# Patient Record
Sex: MALE | Race: WHITE | NOT HISPANIC OR LATINO | Employment: OTHER | URBAN - METROPOLITAN AREA
[De-identification: names, ages, dates, MRNs, and addresses within clinical notes are randomized per-mention and may not be internally consistent; named-entity substitution may affect disease eponyms.]

---

## 2017-02-15 ENCOUNTER — HOSPITAL ENCOUNTER (EMERGENCY)
Facility: HOSPITAL | Age: 62
Discharge: HOME | End: 2017-02-15
Attending: EMERGENCY MEDICINE
Payer: COMMERCIAL

## 2017-02-15 VITALS
OXYGEN SATURATION: 95 % | WEIGHT: 250 LBS | RESPIRATION RATE: 18 BRPM | SYSTOLIC BLOOD PRESSURE: 152 MMHG | HEIGHT: 74 IN | TEMPERATURE: 98 F | HEART RATE: 70 BPM | DIASTOLIC BLOOD PRESSURE: 71 MMHG | BODY MASS INDEX: 32.08 KG/M2

## 2017-02-15 DIAGNOSIS — N20.1 URETERAL CALCULUS, LEFT: Primary | ICD-10-CM

## 2017-02-15 LAB
ANION GAP SERPL CALC-SCNC: 12 MMOL/L
BACTERIA #/AREA URNS HPF: ABNORMAL /HPF
BASOPHILS # BLD AUTO: 0.06 K/UL
BASOPHILS NFR BLD: 0.6 %
BILIRUB UR QL STRIP: NEGATIVE
BUN SERPL-MCNC: 20 MG/DL
CALCIUM SERPL-MCNC: 10.1 MG/DL
CHLORIDE SERPL-SCNC: 103 MMOL/L
CLARITY UR: CLEAR
CO2 SERPL-SCNC: 24 MMOL/L
COLOR UR: YELLOW
CREAT SERPL-MCNC: 1.6 MG/DL
DIFFERENTIAL METHOD: ABNORMAL
EOSINOPHIL # BLD AUTO: 0.3 K/UL
EOSINOPHIL NFR BLD: 3 %
ERYTHROCYTE [DISTWIDTH] IN BLOOD BY AUTOMATED COUNT: 13.7 %
EST. GFR  (AFRICAN AMERICAN): 53 ML/MIN/1.73 M^2
EST. GFR  (NON AFRICAN AMERICAN): 45 ML/MIN/1.73 M^2
GLUCOSE SERPL-MCNC: 125 MG/DL
GLUCOSE UR QL STRIP: NEGATIVE
HCT VFR BLD AUTO: 43.1 %
HGB BLD-MCNC: 14.7 G/DL
HGB UR QL STRIP: ABNORMAL
KETONES UR QL STRIP: NEGATIVE
LEUKOCYTE ESTERASE UR QL STRIP: NEGATIVE
LYMPHOCYTES # BLD AUTO: 1.5 K/UL
LYMPHOCYTES NFR BLD: 13.6 %
MCH RBC QN AUTO: 30.6 PG
MCHC RBC AUTO-ENTMCNC: 34.1 %
MCV RBC AUTO: 90 FL
MICROSCOPIC COMMENT: ABNORMAL
MONOCYTES # BLD AUTO: 1.1 K/UL
MONOCYTES NFR BLD: 10.6 %
NEUTROPHILS # BLD AUTO: 7.7 K/UL
NEUTROPHILS NFR BLD: 72.2 %
NITRITE UR QL STRIP: NEGATIVE
PH UR STRIP: 6 [PH] (ref 5–8)
PLATELET # BLD AUTO: 197 K/UL
PMV BLD AUTO: 9.3 FL
POTASSIUM SERPL-SCNC: 4.2 MMOL/L
PROT UR QL STRIP: NEGATIVE
RBC # BLD AUTO: 4.81 M/UL
RBC #/AREA URNS HPF: 5 /HPF (ref 0–4)
SODIUM SERPL-SCNC: 139 MMOL/L
SP GR UR STRIP: 1.02 (ref 1–1.03)
SQUAMOUS #/AREA URNS HPF: 0 /HPF
URN SPEC COLLECT METH UR: ABNORMAL
UROBILINOGEN UR STRIP-ACNC: NEGATIVE EU/DL
WBC # BLD AUTO: 10.71 K/UL
WBC #/AREA URNS HPF: 0 /HPF (ref 0–5)

## 2017-02-15 PROCEDURE — 99284 EMERGENCY DEPT VISIT MOD MDM: CPT | Mod: 25

## 2017-02-15 PROCEDURE — 63600175 PHARM REV CODE 636 W HCPCS: Performed by: EMERGENCY MEDICINE

## 2017-02-15 PROCEDURE — 81000 URINALYSIS NONAUTO W/SCOPE: CPT

## 2017-02-15 PROCEDURE — 80048 BASIC METABOLIC PNL TOTAL CA: CPT

## 2017-02-15 PROCEDURE — 25000003 PHARM REV CODE 250: Performed by: EMERGENCY MEDICINE

## 2017-02-15 PROCEDURE — 96374 THER/PROPH/DIAG INJ IV PUSH: CPT

## 2017-02-15 PROCEDURE — 85025 COMPLETE CBC W/AUTO DIFF WBC: CPT

## 2017-02-15 PROCEDURE — 96361 HYDRATE IV INFUSION ADD-ON: CPT

## 2017-02-15 RX ORDER — OXYCODONE AND ACETAMINOPHEN 10; 325 MG/1; MG/1
1 TABLET ORAL EVERY 4 HOURS PRN
Qty: 12 TABLET | Refills: 0 | Status: SHIPPED | OUTPATIENT
Start: 2017-02-15 | End: 2017-03-20

## 2017-02-15 RX ORDER — KETOROLAC TROMETHAMINE 30 MG/ML
15 INJECTION, SOLUTION INTRAMUSCULAR; INTRAVENOUS
Status: COMPLETED | OUTPATIENT
Start: 2017-02-15 | End: 2017-02-15

## 2017-02-15 RX ORDER — TAMSULOSIN HYDROCHLORIDE 0.4 MG/1
0.4 CAPSULE ORAL
Status: COMPLETED | OUTPATIENT
Start: 2017-02-15 | End: 2017-02-15

## 2017-02-15 RX ORDER — TAMSULOSIN HYDROCHLORIDE 0.4 MG/1
0.4 CAPSULE ORAL DAILY
Qty: 30 CAPSULE | Refills: 0 | Status: SHIPPED | OUTPATIENT
Start: 2017-02-15 | End: 2017-03-20

## 2017-02-15 RX ORDER — SODIUM CHLORIDE 9 MG/ML
1000 INJECTION, SOLUTION INTRAVENOUS
Status: COMPLETED | OUTPATIENT
Start: 2017-02-15 | End: 2017-02-15

## 2017-02-15 RX ADMIN — SODIUM CHLORIDE 1000 ML: 0.9 INJECTION, SOLUTION INTRAVENOUS at 03:02

## 2017-02-15 RX ADMIN — KETOROLAC TROMETHAMINE 15 MG: 30 INJECTION, SOLUTION INTRAMUSCULAR at 03:02

## 2017-02-15 RX ADMIN — TAMSULOSIN HYDROCHLORIDE 0.4 MG: 0.4 CAPSULE ORAL at 03:02

## 2017-02-15 NOTE — ED NOTES
Pt resting in bed at this time. NAD noted. AAO x 3. Pt reports relief in pain. RR e/u, airway open and patent. Bed in locked and low position, side rails up x 2, call light within reach, will continue to monitor.

## 2017-02-15 NOTE — ED PROVIDER NOTES
SCRIBE #1 NOTE: I, Partha Saleh, am scribing for, and in the presence of, Maia Ramirez MD. I have scribed the entire note.      History      Chief Complaint   Patient presents with    Flank Pain     left flank pain radiating to LLQ since this evening        Review of patient's allergies indicates:  No Known Allergies     HPI   HPI    2/15/2017, 2:52 AM   History obtained from the patient      History of Present Illness: Dominguez Hutton is a 61 y.o. male patient who presents to the Emergency Department for left flank pain which onset gradually 15 hours PTA. Symptoms are constant and moderate in severity. The patient describes the sxs as a stabbing pain. Pt states the pain worsened 9 hours ago while driving back from [place]. Pt states the pain radiates to the LLQ. Pt states the pain is relieved by standing up. No mitigating or exacerbating factors reported. No associated sx reported. Patient denies any fever, chills, hematuria, dysuria, blood in stool, constipation, HA, lightheadedness, dizzziness, and all other sxs at this time. No further complaints or concerns at this time.         Arrival mode: Personal vehicle     PCP: Murtaza Castillo MD       Past Medical History:  Past Medical History   Diagnosis Date    Diabetes mellitus, type 2 4/20/2016     3/4/2016: Fasting Glucose 139.    Disc herniation     Essential hypertension 2/18/2016 2010:  Diagnosed.    History of gout 2/18/2016 1995: Diagnosed. Feet, elbow and knee.     Hypercholesterolemia 2/18/2016 2013: Began statin.    Hyperlipemia     Hypertension     Nonrheumatic aortic insufficiency with aortic stenosis 2/18/2016 9/9/2015: Echo: Normal left ventricular size and systolic function. Moderate LVH. Mild diastolic dysfunction. Mildly dilated LA. Mild aortic valve sclerosis. Mild to moderate AR.       Past Surgical History:  Past Surgical History   Procedure Laterality Date    Hernia repair           Family History:  No family  history on file.    Social History:  Social History     Social History Main Topics    Smoking status: Never Smoker    Smokeless tobacco: Never Used    Alcohol use Yes      Comment: social    Drug use: No    Sexual activity: Unknown       ROS   Review of Systems   Constitutional: Negative for chills and fever.   HENT: Negative for sore throat.    Respiratory: Negative for shortness of breath.    Cardiovascular: Negative for chest pain.   Gastrointestinal: Positive for abdominal pain (LLQ). Negative for blood in stool, constipation, diarrhea, nausea and vomiting.   Genitourinary: Positive for flank pain (left). Negative for dysuria and hematuria.   Musculoskeletal: Negative for back pain.   Skin: Negative for rash.   Neurological: Negative for dizziness, weakness, light-headedness and headaches.   Hematological: Does not bruise/bleed easily.       Physical Exam    Initial Vitals   BP Pulse Resp Temp SpO2   02/15/17 0147 02/15/17 0147 02/15/17 0147 02/15/17 0147 02/15/17 0147   175/80 94 18 98.9 °F (37.2 °C) 96 %      Physical Exam  Nursing Notes and Vital Signs Reviewed.  Constitutional: Patient is in no acute distress. Awake and alert. Well-developed and well-nourished.  Head: Atraumatic. Normocephalic.  Eyes: PERRL. EOM intact. Conjunctivae are not pale. No scleral icterus.  ENT: Mucous membranes are moist. Oropharynx is clear and symmetric.    Neck: Supple. Full ROM. No lymphadenopathy.  Cardiovascular: Regular rate. Regular rhythm. No murmurs, rubs, or gallops. Distal pulses are 2+ and symmetric. Prominant heart sound in sub-intercostal space.  Pulmonary/Chest: No respiratory distress. Clear to auscultation bilaterally. No wheezing, rales, or rhonchi.  Abdominal: Soft and non-distended.  There is no tenderness.  No rebound, guarding, or rigidity. Good bowel sounds.  Genitourinary: Left CVA tenderness  Musculoskeletal: Moves all extremities. No obvious deformities. 2+ bilateral lower extremity edema. No calf  "tenderness.  Skin: Warm and dry.  Neurological:  Alert, awake, and appropriate.  Normal speech.  No acute focal neurological deficits are appreciated.  Psychiatric: Normal affect. Good eye contact. Appropriate in content.    ED Course    Procedures  ED Vital Signs:  Vitals:    02/15/17 0147 02/15/17 0347 02/15/17 0517   BP: (!) 175/80 (!) 148/70 (!) 152/71   Pulse: 94 65 70   Resp: 18 18 18   Temp: 98.9 °F (37.2 °C)  97.6 °F (36.4 °C)   TempSrc: Oral  Oral   SpO2: 96% 95% 95%   Weight: 113.4 kg (250 lb)     Height: 6' 2" (1.88 m)         Abnormal Lab Results:  Labs Reviewed   URINALYSIS - Abnormal; Notable for the following:        Result Value    Occult Blood UA 2+ (*)     All other components within normal limits   CBC W/ AUTO DIFFERENTIAL - Abnormal; Notable for the following:     Mono # 1.1 (*)     Lymph% 13.6 (*)     All other components within normal limits   BASIC METABOLIC PANEL - Abnormal; Notable for the following:     Glucose 125 (*)     Creatinine 1.6 (*)     eGFR if  53 (*)     eGFR if non  45 (*)     All other components within normal limits   URINALYSIS MICROSCOPIC - Abnormal; Notable for the following:     RBC, UA 5 (*)     All other components within normal limits        All Lab Results:  Results for orders placed or performed during the hospital encounter of 02/15/17   Urinalysis   Result Value Ref Range    Specimen UA Urine, Clean Catch     Color, UA Yellow Yellow, Straw, Marianela    Appearance, UA Clear Clear    pH, UA 6.0 5.0 - 8.0    Specific Gravity, UA 1.025 1.005 - 1.030    Protein, UA Negative Negative    Glucose, UA Negative Negative    Ketones, UA Negative Negative    Bilirubin (UA) Negative Negative    Occult Blood UA 2+ (A) Negative    Nitrite, UA Negative Negative    Urobilinogen, UA Negative <2.0 EU/dL    Leukocytes, UA Negative Negative   CBC auto differential   Result Value Ref Range    WBC 10.71 3.90 - 12.70 K/uL    RBC 4.81 4.60 - 6.20 M/uL    " Hemoglobin 14.7 14.0 - 18.0 g/dL    Hematocrit 43.1 40.0 - 54.0 %    MCV 90 82 - 98 fL    MCH 30.6 27.0 - 31.0 pg    MCHC 34.1 32.0 - 36.0 %    RDW 13.7 11.5 - 14.5 %    Platelets 197 150 - 350 K/uL    MPV 9.3 9.2 - 12.9 fL    Gran # 7.7 1.8 - 7.7 K/uL    Lymph # 1.5 1.0 - 4.8 K/uL    Mono # 1.1 (H) 0.3 - 1.0 K/uL    Eos # 0.3 0.0 - 0.5 K/uL    Baso # 0.06 0.00 - 0.20 K/uL    Gran% 72.2 38.0 - 73.0 %    Lymph% 13.6 (L) 18.0 - 48.0 %    Mono% 10.6 4.0 - 15.0 %    Eosinophil% 3.0 0.0 - 8.0 %    Basophil% 0.6 0.0 - 1.9 %    Differential Method Automated    Basic metabolic panel   Result Value Ref Range    Sodium 139 136 - 145 mmol/L    Potassium 4.2 3.5 - 5.1 mmol/L    Chloride 103 95 - 110 mmol/L    CO2 24 23 - 29 mmol/L    Glucose 125 (H) 70 - 110 mg/dL    BUN, Bld 20 8 - 23 mg/dL    Creatinine 1.6 (H) 0.5 - 1.4 mg/dL    Calcium 10.1 8.7 - 10.5 mg/dL    Anion Gap 12 8 - 16 mmol/L    eGFR if African American 53 (A) >60 mL/min/1.73 m^2    eGFR if non African American 45 (A) >60 mL/min/1.73 m^2   Urinalysis Microscopic   Result Value Ref Range    RBC, UA 5 (H) 0 - 4 /hpf    WBC, UA 0 0 - 5 /hpf    Bacteria, UA None None-Occ /hpf    Squam Epithel, UA 0 /hpf    Microscopic Comment SEE COMMENT          Imaging Results:  Imaging Results         CT Renal Stone Study ABD Pelvis WO (Final result) Result time:  02/15/17 08:40:52    Final result by Tristen Serrano MD (02/15/17 08:40:52)    Impression:             1.  Obstructing 10 mm calculus in the proximal left ureter causing mild left hydronephrosis.  2.  Numerous bilateral nonobstructing renal calculi.    All CT scans at this facility use dose modulation, iterative reconstruction and/or weight based dosing when appropriate to reduce radiation dose to as low as reasonably achievable.      Electronically signed by: TRISTEN SERRANO MD  Date:     02/15/17  Time:    08:40     Narrative:    Exam: CT abdomen and pelvis without intravenous contrast.    Technique: Axial CT images  performed through the abdomen and pelvis without intravenous contrast. Multiplanar reformats were performed and interpreted.    Comparison: None    Clinical History: left flank pain.   Abdominal pain    Findings:         Lung bases are clear.    There is an obstructing 10 mm calculus in the proximal left ureter causing mild left hydronephrosis and asymmetric left perinephric fat stranding.  There are numerous bilateral nonobstructing renal calculi.  No hydronephrosis of the right kidney.  There are a couple of small low density nodules in the inferior liver edge which are too small to characterize on this unenhanced exam, but probably small simple cysts.  The spleen, adrenal glands, and pancreas are unremarkable.  Normal appendix.   The gallbladder is unremarkable.  No peritoneal free fluid, free air, or inflammatory change.     The bowel is nondistended and within normal limits.    The abdominal aorta is normal in caliber.    The urinary bladder is unremarkable.       There are degenerative facet joint changes bilaterally in the lower lumbar spine.             Ordered, reviewed, and independently interpreted by the ED provider.  Study: CT Renal stone study ABD Pelvis WO  Findings: Nephrolithiasis. Proximal left ureter 9 mm renal stone and mild left hydronephrosis are noted. Diverticulosis coli.             The Emergency Provider reviewed the vital signs and test results, which are outlined above.    ED Discussion     4:48 AM: Reassessed pt at this time. Discussed with pt all pertinent ED information and results. Discussed pt dx and plan of tx. Gave pt all f/u and return to the ED instructions. All questions and concerns were addressed at this time. Pt expresses understanding of information and instructions, and is comfortable with plan to discharge. Pt is stable for discharge.    Pre-hypertension/Hypertension: The pt has been informed that they may have pre-hypertension or hypertension based on a blood pressure  reading in the ED. I recommend that the pt call the PCP listed on their discharge instructions or a physician of their choice this week to arrange f/u for further evaluation of possible pre-hypertension or hypertension.       ED Medication(s):  Medications   0.9%  NaCl infusion (0 mLs Intravenous Stopped 2/15/17 0517)   ketorolac injection 15 mg (15 mg Intravenous Given 2/15/17 0310)   tamsulosin 24 hr capsule 0.4 mg (0.4 mg Oral Given 2/15/17 0310)       Discharge Medication List as of 2/15/2017  4:47 AM      START taking these medications    Details   tamsulosin (FLOMAX) 0.4 mg Cp24 Take 1 capsule (0.4 mg total) by mouth once daily., Starting 2/15/2017, Until Discontinued, Print             Follow-up Information     Follow up with Baljinder Marie IV, MD In 2 days.    Specialty:  Urology    Contact information:    [address]  [place] [address] [phone]          Follow up with [facility] - .    Specialty:  Emergency Medicine    Why:  As needed, If symptoms worsen    Contact information:    [address] [facility] [id number] [phone]            Medical Decision Making    Medical Decision Making:   Clinical Tests:   Lab Tests: Reviewed and Ordered  Radiological Study: Reviewed and Ordered           Scribe Attestation:   Scribe #1: I performed the above scribed service and the documentation accurately describes the services I performed. I attest to the accuracy of the note.    Attending:   Physician Attestation Statement for Scribe #1: I, Maia Ramirez MD, personally performed the services described in this documentation, as scribed by Partha Saleh, in my presence, and it is both accurate and complete.          Clinical Impression       ICD-10-CM ICD-9-CM   1. Ureteral calculus, left N20.1 592.1       Disposition:   Disposition: Discharged  Condition: Stable         Maia Ramirez MD  02/16/17 1658

## 2017-02-15 NOTE — ED NOTES
NAD noted. AAO x 3. RR e/u, airway open and patent. Pt able to ambulate without assistance. Will continue with discharge.

## 2017-02-15 NOTE — ED AVS SNAPSHOT
[facility]  [id number] [place] [id number]               Dominguez Hutton   2/15/2017  2:41 AM   ED    Description:  Male : 1955   Department:  [facility]           Your Care was Coordinated By:     Provider Role From To    Maia Ramirez MD Attending Provider 02/15/17 0245 --      Reason for Visit     Flank Pain           Diagnoses this Visit        Comments    Ureteral calculus, left    -  Primary       ED Disposition     ED Disposition Condition Comment    Discharge             To Do List           Follow-up Information     Follow up with Baljinder Marie IV, MD In 2 days.    Specialty:  Urology    Contact information:    [address]  [place] [address]  [phone]          Follow up with [facility].    Specialty:  Emergency Medicine    Why:  As needed, If symptoms worsen    Contact information:    [address] [place] [id number] [phone]       These Medications        Disp Refills Start End    tamsulosin (FLOMAX) 0.4 mg Cp24 30 capsule 0 2/15/2017     Take 1 capsule (0.4 mg total) by mouth once daily. - Oral    Pharmacy: [facility] [address] [place] [address] [address] AT [facility] Ph #: [phone]       oxycodone-acetaminophen (PERCOCET)  mg per tablet 12 tablet 0 2/15/2017     Take 1 tablet by mouth every 4 (four) hours as needed for Pain. - Oral    Pharmacy: [facility] [address] [place] [address] [address] AT [facility] Ph #: [phone]         [facility]     [facility] -  Assistance  Registered nurses in the [facility] provide clinical advisement, health education, appointment booking, and other advisory services.  Call for this free service at [phone].             Medications           Message regarding Medications     Verify the changes  and/or additions to your medication regime listed below are the same as discussed with your clinician today.  If any of these changes or additions are incorrect, please notify your healthcare provider.        START taking these NEW medications        Refills    tamsulosin (FLOMAX) 0.4 mg Cp24 0    Sig: Take 1 capsule (0.4 mg total) by mouth once daily.    Class: Print    Route: Oral      These medications were administered today        Dose Freq    0.9%  NaCl infusion 1,000 mL ED 1 Time    Sig: Inject 1,000 mLs into the vein ED 1 Time.    Class: Normal    Route: Intravenous    ketorolac injection 15 mg 15 mg ED 1 Time    Sig: Inject 15 mg into the vein ED 1 Time.    Class: Normal    Route: Intravenous    Non-formulary Exception Code: Defer to pharmacy    tamsulosin 24 hr capsule 0.4 mg 0.4 mg ED 1 Time    Sig: Take 1 capsule (0.4 mg total) by mouth ED 1 Time.    Class: Normal    Route: Oral           Verify that the below list of medications is an accurate representation of the medications you are currently taking.  If none reported, the list may be blank. If incorrect, please contact your healthcare provider. Carry this list with you in case of emergency.           Current Medications     alendronate-vitamin D3 (FOSAMAX PLUS D) 70-2,800 mg-unit per tablet Take 1 tablet by mouth every 7 days.    allopurinol (ZYLOPRIM) 300 MG tablet Take 300 mg by mouth once daily.     amlodipine (NORVASC) 10 MG tablet Take 1 tablet (10 mg total) by mouth once daily.    aspirin (ECOTRIN) 81 MG EC tablet Take 81 mg by mouth once daily.    b complex vitamins tablet Take 1 tablet by mouth once daily.    ergocalciferol (ERGOCALCIFEROL) 50,000 unit Cap Take 50,000 Units by mouth every 7 days.    fish oil-omega-3 fatty acids 300-1,000 mg capsule Take 2 g by mouth once daily.    gabapentin (NEURONTIN) 300 MG capsule Take 1 capsule (300 mg total) by mouth 3 (three) times daily.    gabapentin (NEURONTIN) 300 MG capsule Take one in the  "morning, one in the afternoon and two at bedtime for 5 days.  Then, two in the morning, one in the afternoon and two at bedtime for 5 days.  Then, two in the morning, two in the afternoon and two at bedtime if tolerated.    hydrochlorothiazide (MICROZIDE) 12.5 mg capsule Take 1 capsule (12.5 mg total) by mouth once daily.    hydrocodone-acetaminophen 10-325mg (NORCO)  mg Tab Take 1 tablet by mouth 3 (three) times daily as needed.     HYDROmorphone (DILAUDID) 4 MG tablet Take 1 tablet (4 mg total) by mouth every 3 (three) hours as needed for Pain.    ketorolac (TORADOL) 10 mg tablet Take 1 tablet (10 mg total) by mouth 2 (two) times daily as needed for Pain.    lisinopril (PRINIVIL,ZESTRIL) 20 MG tablet Take 20 mg by mouth once daily.     multivitamin (THERAGRAN) per tablet Take 1 tablet by mouth once daily.    oxycodone-acetaminophen (PERCOCET)  mg per tablet Take 1 tablet by mouth every 4 (four) hours as needed for Pain.    pravastatin (PRAVACHOL) 40 MG tablet Take 40 mg by mouth once daily.     tamsulosin (FLOMAX) 0.4 mg Cp24 Take 1 capsule (0.4 mg total) by mouth once daily.    tamsulosin 24 hr capsule 0.4 mg Take 1 capsule (0.4 mg total) by mouth ED 1 Time.           Clinical Reference Information           Your Vitals Were     BP Pulse Temp Resp Height Weight    148/70 65 98.9 °F (37.2 °C) (Oral) 18 6' 2" (1.88 m) 113.4 kg (250 lb)    SpO2 BMI             95% 32.1 kg/m2         Allergies as of 2/15/2017     No Known Allergies      Immunizations Administered on Date of Encounter - 2/15/2017     None      ED Micro, Lab, POCT     Start Ordered       Status Ordering Provider    02/15/17 0256 02/15/17 0256  CBC auto differential  STAT      Final result     02/15/17 0256 02/15/17 0256  Basic metabolic panel  STAT      Final result     02/15/17 0153 02/15/17 0152  Urinalysis  STAT      Final result     02/15/17 0152 02/15/17 0152  Urinalysis Microscopic  Once      Final result       ED Imaging Orders     " Start Ordered       Status Ordering Provider    02/15/17 0400 02/15/17 0359  CT Renal Stone Study ABD Pelvis WO  1 time imaging      In process         Discharge Instructions         Kidney Stone (with Pain)    The sharp cramping pain on either side of your lower back and nausea/vomiting that you have are because of a small stone that has formed in the kidney. It is now passing down a narrow tube (ureter) on its way to your bladder. Once the stone reaches your bladder, the pain will often stop. But it may come back as the stone continues to pass out of the bladder and through the urethra. The stone may pass in your urine stream in one piece. The size may be 1/16 inch to 1/4 inch (1 mm to 6 mm). Or, the stone may break up into azra fragments that you may not even notice.  Once you have had a kidney stone, you are at risk of getting another one in the future. There are 4 types of kidney stones. Eighty percent are calcium stones--mostly calcium oxalate but also some with calcium phosphate. The other 3 types include uric acid stones, struvite stones (from a preceding infection), and rarely, cystine stones.  Most stones will pass on their own, but may take from a few hours to a few days. Sometimes the stone is too large to pass by itself. In that case, the healthcare provider will need to use other ways to remove the stone. These techniques include:  · Lithotripsy. This uses ultrasound waves to break up the stone.  · Ureteroscopy. This pushes a basket-like instrument through the urethra and bladder and into the ureter to pull out the stone.  · Various types of direct surgery through the skin  Home care  The following are general care guidelines:  · Drink plenty of fluids. This means at least 12, 8-ounce glasses of fluid--mostly water--a day.  · Each time you urinate, do so in a jar. Pour the urine from the jar through the strainer and into the toilet. Continue doing this until 24 hours after your pain stops. By then, if  there was a kidney stone, it should pass from your bladder. Some stones dissolve into sand-like particles and pass right through the strainer. In that case, you wont ever see a stone.  · Save any stone that you find in the strainer and bring it to your healthcare provider to look at. It may be possible to stop certain types of stones from forming. For this reason, it is important to know what kind of stone you have.  · Try to stay as active as possible. This will help the stone pass. Don't stay in bed unless your pain keeps you from getting up. You may notice a red, pink, or brown color to your urine. This is normal while passing a kidney stone.  · If you develop pain, you may take ibuprofen or naproxen for pain, unless another medicine was prescribed. If you have chronic liver or kidney disease, talk with your healthcare provider before taking these medicines. Also talk with your provider if you've had a stomach ulcer or GI bleeding.  Preventing stones  Each year for the next 5 to 7 years, you are at risk that a new stone will form. Your risk is a 50% chance over this time period. The risk is higher if you have a family history of kidney stones or have certain chronic illnesses like hypertension, obesity, or diabetes. But you can make changes to your lifestyle and diet that can lower your risk for another stone.  Most kidney stones are made of calcium. The following is advice for preventing another calcium stone. If you dont know the type of stone you have, follow this advice until the cause of your stone is found.  Things that help:  · The most important thing you can do is to drink plenty of fluids each day. See home care above.   · Eat foods that contain phytates. These include wheat, rice, rye, barley, and beans. Phytates are substances that may lower your risk for any type of stone to form.  · Eat more fruits and vegetables. Choose those that are high in potassium.  · Eat foods high in natural citrate  like fruit and low-sugar fruit juices.  · Having too little calcium in your diet can put you at risk for calcium kidney stones. Eat a normal amount of calcium in your diet and talk with your healthcare provider if you are taking calcium supplements. Cutting back on your calcium intake may raise your risk. New research shows that eating calcium-rich and oxalate-rich foods together lowers your risk for stones by binding the minerals in the stomach and intestines before they can reach the kidneys.    · Limit salt intake to 2 grams (1 teaspoon) per day. Use limited amounts when cooking, and dont add salt at the table. Processed and canned foods are usually high in salt.   · Spinach, rhubarb, peanuts, cashews, almonds, grapefruit, and grapefruit juice are all high oxalate foods. You should limit how much of these you eat. Or eat them with calcium-rich foods. These include dairy products, dark leafy greens, soy products, and calcium-enriched foods.  · Reducing the amount of animal meat and high protein foods in your diet may lower your risk for uric acid stones.  · Avoid excess sugar (sucrose) and fructose (sweetener in many soft drinks) in your diet.   · If you take vitamin C as a supplement, don't take more than 1,000 mg a day.  · A dietitian or your healthcare provider can give you information about changes in your diet that will help prevent more kidney stones from forming.  Follow-up care  Follow up with your healthcare provider, or as advised, if the pain lasts more than 48 hours. Talk with your provider about urine and blood tests to find out the cause of your stone. If you had an X-ray, CT scan, or other diagnostic test, you will be told of any new findings that may affect your care.  Call 911  Call 911 if you have any of these:  · Weakness, dizziness, or fainting  When to seek medical advice  Call your healthcare provider right away if any of these occur:  · Pain that is not controlled by the medicine  given  · Repeated vomiting or unable to keep down fluids  · Fever of 100.4ºF (38ºC) or higher, or as directed by your healthcare provider  · Passage of solid red or brown urine (can't see through it) or urine with lots of blood clots  · Foul-smelling or cloudy urine  · Unable to pass urine for 8 hours and increasing bladder pressure  Date Last Reviewed: 10/1/2016  © 7691-8822 [employer] [address] [place] [address], [place] [address]. All rights reserved. This information is not intended as a substitute for professional medical care. Always follow your healthcare professional's instructions.           [facility] complies with applicable Federal civil rights laws and does not discriminate on the basis of race, color, national origin, age, disability, or sex.        Language Assistance Services     ATTENTION: Language assistance services are available, free of charge. Please call [phone].      ATENCIÓN: Si habla español, tiene a vidales disposición servicios gratuitos de asistencia lingüística. Llame al [phone].     CHÚ Ý: N?u b?n nói Ti?ng Vi?t, có các d?ch v? h? tr? ngôn ng? mi?n phí dành cho b?n. G?i s? [phone].

## 2017-02-15 NOTE — DISCHARGE INSTRUCTIONS
Kidney Stone (with Pain)    The sharp cramping pain on either side of your lower back and nausea/vomiting that you have are because of a small stone that has formed in the kidney. It is now passing down a narrow tube (ureter) on its way to your bladder. Once the stone reaches your bladder, the pain will often stop. But it may come back as the stone continues to pass out of the bladder and through the urethra. The stone may pass in your urine stream in one piece. The size may be 1/16 inch to 1/4 inch (1 mm to 6 mm). Or, the stone may break up into azra fragments that you may not even notice.  Once you have had a kidney stone, you are at risk of getting another one in the future. There are 4 types of kidney stones. Eighty percent are calcium stones--mostly calcium oxalate but also some with calcium phosphate. The other 3 types include uric acid stones, struvite stones (from a preceding infection), and rarely, cystine stones.  Most stones will pass on their own, but may take from a few hours to a few days. Sometimes the stone is too large to pass by itself. In that case, the healthcare provider will need to use other ways to remove the stone. These techniques include:  · Lithotripsy. This uses ultrasound waves to break up the stone.  · Ureteroscopy. This pushes a basket-like instrument through the urethra and bladder and into the ureter to pull out the stone.  · Various types of direct surgery through the skin  Home care  The following are general care guidelines:  · Drink plenty of fluids. This means at least 12, 8-ounce glasses of fluid--mostly water--a day.  · Each time you urinate, do so in a jar. Pour the urine from the jar through the strainer and into the toilet. Continue doing this until 24 hours after your pain stops. By then, if there was a kidney stone, it should pass from your bladder. Some stones dissolve into sand-like particles and pass right through the strainer. In that case, you wont ever see a  stone.  · Save any stone that you find in the strainer and bring it to your healthcare provider to look at. It may be possible to stop certain types of stones from forming. For this reason, it is important to know what kind of stone you have.  · Try to stay as active as possible. This will help the stone pass. Don't stay in bed unless your pain keeps you from getting up. You may notice a red, pink, or brown color to your urine. This is normal while passing a kidney stone.  · If you develop pain, you may take ibuprofen or naproxen for pain, unless another medicine was prescribed. If you have chronic liver or kidney disease, talk with your healthcare provider before taking these medicines. Also talk with your provider if you've had a stomach ulcer or GI bleeding.  Preventing stones  Each year for the next 5 to 7 years, you are at risk that a new stone will form. Your risk is a 50% chance over this time period. The risk is higher if you have a family history of kidney stones or have certain chronic illnesses like hypertension, obesity, or diabetes. But you can make changes to your lifestyle and diet that can lower your risk for another stone.  Most kidney stones are made of calcium. The following is advice for preventing another calcium stone. If you dont know the type of stone you have, follow this advice until the cause of your stone is found.  Things that help:  · The most important thing you can do is to drink plenty of fluids each day. See home care above.   · Eat foods that contain phytates. These include wheat, rice, rye, barley, and beans. Phytates are substances that may lower your risk for any type of stone to form.  · Eat more fruits and vegetables. Choose those that are high in potassium.  · Eat foods high in natural citrate like fruit and low-sugar fruit juices.  · Having too little calcium in your diet can put you at risk for calcium kidney stones. Eat a normal amount of calcium in your diet and  talk with your healthcare provider if you are taking calcium supplements. Cutting back on your calcium intake may raise your risk. New research shows that eating calcium-rich and oxalate-rich foods together lowers your risk for stones by binding the minerals in the stomach and intestines before they can reach the kidneys.    · Limit salt intake to 2 grams (1 teaspoon) per day. Use limited amounts when cooking, and dont add salt at the table. Processed and canned foods are usually high in salt.   · Spinach, rhubarb, peanuts, cashews, almonds, grapefruit, and grapefruit juice are all high oxalate foods. You should limit how much of these you eat. Or eat them with calcium-rich foods. These include dairy products, dark leafy greens, soy products, and calcium-enriched foods.  · Reducing the amount of animal meat and high protein foods in your diet may lower your risk for uric acid stones.  · Avoid excess sugar (sucrose) and fructose (sweetener in many soft drinks) in your diet.   · If you take vitamin C as a supplement, don't take more than 1,000 mg a day.  · A dietitian or your healthcare provider can give you information about changes in your diet that will help prevent more kidney stones from forming.  Follow-up care  Follow up with your healthcare provider, or as advised, if the pain lasts more than 48 hours. Talk with your provider about urine and blood tests to find out the cause of your stone. If you had an X-ray, CT scan, or other diagnostic test, you will be told of any new findings that may affect your care.  Call 911  Call 911 if you have any of these:  · Weakness, dizziness, or fainting  When to seek medical advice  Call your healthcare provider right away if any of these occur:  · Pain that is not controlled by the medicine given  · Repeated vomiting or unable to keep down fluids  · Fever of 100.4ºF (38ºC) or higher, or as directed by your healthcare provider  · Passage of solid red or brown urine (can't  see through it) or urine with lots of blood clots  · Foul-smelling or cloudy urine  · Unable to pass urine for 8 hours and increasing bladder pressure  Date Last Reviewed: 10/1/2016  © 4007-9987 [employer]. [address] [place] [address], [place] [address]. All rights reserved. This information is not intended as a substitute for professional medical care. Always follow your healthcare professional's instructions.

## 2017-02-16 ENCOUNTER — HOSPITAL ENCOUNTER (OUTPATIENT)
Dept: RADIOLOGY | Facility: HOSPITAL | Age: 62
Discharge: HOME | End: 2017-02-16
Attending: UROLOGY
Payer: COMMERCIAL

## 2017-02-16 ENCOUNTER — OFFICE VISIT (OUTPATIENT)
Dept: UROLOGY | Facility: CLINIC | Age: 62
End: 2017-02-16
Payer: COMMERCIAL

## 2017-02-16 ENCOUNTER — APPOINTMENT (OUTPATIENT)
Dept: CARDIOLOGY | Facility: CLINIC | Age: 62
End: 2017-02-16
Payer: COMMERCIAL

## 2017-02-16 ENCOUNTER — TELEPHONE (OUTPATIENT)
Dept: UROLOGY | Facility: CLINIC | Age: 62
End: 2017-02-16

## 2017-02-16 VITALS — WEIGHT: 260.13 LBS | BODY MASS INDEX: 33.4 KG/M2 | SYSTOLIC BLOOD PRESSURE: 138 MMHG | DIASTOLIC BLOOD PRESSURE: 72 MMHG

## 2017-02-16 DIAGNOSIS — N20.1 URETERAL STONE: ICD-10-CM

## 2017-02-16 DIAGNOSIS — N20.1 URETERAL STONE: Primary | ICD-10-CM

## 2017-02-16 PROCEDURE — 93000 ELECTROCARDIOGRAM COMPLETE: CPT | Mod: S$GLB,,, | Performed by: INTERNAL MEDICINE

## 2017-02-16 PROCEDURE — 99999 PR PBB SHADOW E&M-EST. PATIENT-LVL V: CPT | Mod: PBBFAC,,, | Performed by: UROLOGY

## 2017-02-16 PROCEDURE — 74000 XR ABDOMEN AP 1 VIEW: CPT | Mod: TC

## 2017-02-16 PROCEDURE — 71020 XR CHEST PA AND LATERAL: CPT | Mod: TC

## 2017-02-16 PROCEDURE — 99244 OFF/OP CNSLTJ NEW/EST MOD 40: CPT | Mod: S$GLB,,, | Performed by: UROLOGY

## 2017-02-16 PROCEDURE — 74000 XR ABDOMEN AP 1 VIEW: CPT | Mod: 26,,, | Performed by: RADIOLOGY

## 2017-02-16 PROCEDURE — 71020 XR CHEST PA AND LATERAL: CPT | Mod: 26,,, | Performed by: RADIOLOGY

## 2017-02-16 NOTE — LETTER
February 16, 2017        Maia Ramirez MD  [address] [place] [address]             [facility]  [address]  [place] [address]  Phone: [phone]  Fax: [phone]   Patient: Dominguez Hutton   MR Number: [id number]   YOB: 1955   Date of Visit: 2/16/2017       Dear Dr. Ramirez:    Thank you for referring Dominguez Hutton to me for evaluation. Below are the relevant portions of my assessment and plan of care.            If you have questions, please do not hesitate to call me. I look forward to following Dominguez along with you.    Sincerely,      Baljinder Marie IV, MD           CC  No Recipients

## 2017-02-16 NOTE — TELEPHONE ENCOUNTER
----- Message from Demario Carlton sent at 2/16/2017  8:19 AM CST -----  Contact: pt  He's calling in regards to being worked into the dr's schedule today for an early morning appt, please advise, [phone] (home)

## 2017-02-16 NOTE — PROGRESS NOTES
Chief Complaint: Ureteral stone    HPI:   2/16/17: 62 yo man recently went to the ER with left flank pain and CT revealed a 10mm left proximal ureteral stone at the pelvic brim.   Referred by Dr. Ramirez. No other abd/pelvic pain and no exac/rel factors.  No gross hematuria.  No prior urolithiasis.  No urinary bother.  No  history.  Leaving Saturday to go to [place] for 8 weeks; works at [employer] lately.  PSA normal last year normal and PCP at home does his prostate cancer screening.    Allergies:  Review of patient's allergies indicates no known allergies.    Medications:  has a current medication list which includes the following prescription(s): alendronate-vitamin d3, allopurinol, amlodipine, aspirin, b complex vitamins, ergocalciferol, fish oil-omega-3 fatty acids, gabapentin, gabapentin, hydrochlorothiazide, hydrocodone-acetaminophen 10-325mg, hydromorphone, ketorolac, lisinopril, multivitamin, oxycodone-acetaminophen, pravastatin, and tamsulosin.    Review of Systems:  General: No fever, chills, fatigability, or weight loss.  Skin: No rashes, itching, or changes in color or texture of skin.  Chest: Denies HARDWICK, cyanosis, wheezing, cough, and sputum production.  Abdomen: Appetite fine. No weight loss. Denies diarrhea, abdominal pain, hematemesis, or blood in stool.  Musculoskeletal: No joint stiffness or swelling. Denies back pain.  : As above.  All other review of systems negative.    PMH:   has a past medical history of Diabetes mellitus, type 2 (4/20/2016); Disc herniation; Essential hypertension (2/18/2016); History of gout (2/18/2016); Hypercholesterolemia (2/18/2016); Hyperlipemia; Hypertension; and Nonrheumatic aortic insufficiency with aortic stenosis (2/18/2016).    PSH:   has a past surgical history that includes Hernia repair.    FamHx: family history is not on file.    SocHx:  reports that he has never smoked. He has never used smokeless tobacco. He reports that he drinks alcohol. He  reports that he does not use illicit drugs.      Physical Exam:  There were no vitals filed for this visit.  General: A&Ox3, no apparent distress, no deformities  Neck: No masses, normal thyroid  Lungs: normal inspiration, no use of accessory muscles  Heart: normal pulse, no arrhythmias  Abdomen: Soft, NT, ND, no masses, no hernias, no hepatosplenomegaly  Lymphatic: Neck and groin nodes negative  Skin: The skin is warm and dry. No jaundice.  Ext: No c/c/e.  : Test desc conchis, no abnormalities of epididymus. Penis normal, with normal penile and scrotal skin. Meatus normal.     Labs/Studies: Urinalysis performed in clinic, summary: UA normal exc tr blood    Impression/Plan:   1. Discussed prevention measures.  Risks/benefits of left ESWL discussed and consents on chart.

## 2017-02-16 NOTE — TELEPHONE ENCOUNTER
Spoke with patient and informed him that Dr. Marie is in surgery this AM but he may come early for his appointment. Patient states understanding.

## 2017-02-16 NOTE — MR AVS SNAPSHOT
[facility]  [address]  [place] [address]  Phone: [phone]  Fax: [phone]                  Dominguez Hutton   2017 2:20 PM   Office Visit    Description:  Male : 1955   Provider:  Baljinder Marie IV, MD   Department:  [facility]           Reason for Visit     Other           Diagnoses this Visit        Comments    Ureteral stone    -  Primary            To Do List           Future Appointments        Provider Department Dept Phone    2017 3:30 PM EKG, [facility] [phone]      Goals (5 Years of Data)     None      [facility]     [facility] On Call Nurse [facility] Line -  Assistance  Registered nurses in the [facility] Call Center provide clinical advisement, health education, appointment booking, and other advisory services.  Call for this free service at [phone].             Medications           Message regarding Medications     Verify the changes and/or additions to your medication regime listed below are the same as discussed with your clinician today.  If any of these changes or additions are incorrect, please notify your healthcare provider.             Verify that the below list of medications is an accurate representation of the medications you are currently taking.  If none reported, the list may be blank. If incorrect, please contact your healthcare provider. Carry this list with you in case of emergency.           Current Medications     alendronate-vitamin D3 (FOSAMAX PLUS D) 70-2,800 mg-unit per tablet Take 1 tablet by mouth every 7 days.    allopurinol (ZYLOPRIM) 300 MG tablet Take 300 mg by mouth once daily.     amlodipine (NORVASC) 10 MG tablet Take 1 tablet (10 mg total) by mouth once daily.    aspirin (ECOTRIN) 81 MG EC tablet Take 81 mg by mouth once daily.    b complex vitamins tablet Take 1 tablet by mouth once daily.    ergocalciferol (ERGOCALCIFEROL) 50,000 unit Cap Take 50,000  Units by mouth every 7 days.    fish oil-omega-3 fatty acids 300-1,000 mg capsule Take 2 g by mouth once daily.    gabapentin (NEURONTIN) 300 MG capsule Take 1 capsule (300 mg total) by mouth 3 (three) times daily.    gabapentin (NEURONTIN) 300 MG capsule Take one in the morning, one in the afternoon and two at bedtime for 5 days.  Then, two in the morning, one in the afternoon and two at bedtime for 5 days.  Then, two in the morning, two in the afternoon and two at bedtime if tolerated.    hydrochlorothiazide (MICROZIDE) 12.5 mg capsule Take 1 capsule (12.5 mg total) by mouth once daily.    hydrocodone-acetaminophen 10-325mg (NORCO)  mg Tab Take 1 tablet by mouth 3 (three) times daily as needed.     HYDROmorphone (DILAUDID) 4 MG tablet Take 1 tablet (4 mg total) by mouth every 3 (three) hours as needed for Pain.    ketorolac (TORADOL) 10 mg tablet Take 1 tablet (10 mg total) by mouth 2 (two) times daily as needed for Pain.    lisinopril (PRINIVIL,ZESTRIL) 20 MG tablet Take 20 mg by mouth once daily.     multivitamin (THERAGRAN) per tablet Take 1 tablet by mouth once daily.    oxycodone-acetaminophen (PERCOCET)  mg per tablet Take 1 tablet by mouth every 4 (four) hours as needed for Pain.    pravastatin (PRAVACHOL) 40 MG tablet Take 40 mg by mouth once daily.     tamsulosin (FLOMAX) 0.4 mg Cp24 Take 1 capsule (0.4 mg total) by mouth once daily.           Clinical Reference Information           Your Vitals Were     BP Weight BMI          138/72 (BP Location: Left arm, Patient Position: Sitting, BP Method: Manual) 118 kg (260 lb 2.3 oz) 33.4 kg/m2        Blood Pressure          Most Recent Value    BP  138/72      Allergies as of 2/16/2017     No Known Allergies      Immunizations Administered on Date of Encounter - 2/16/2017     None      Orders Placed During Today's Visit      Normal Orders This Visit    Case Request Operating Room: LITHOTRIPSY-EXTRACORPOREAL SHOCK WAVE     EKG 12-lead     POCT urine  dipstick without microscope     X-Ray Chest PA And Lateral     Future Labs/Procedures Expected by Expires    X-Ray Abdomen AP 1 View  2/16/2017 2/16/2018      Language Assistance Services     ATTENTION: Language assistance services are available, free of charge. Please call [phone].      ATENCIÓN: Si habla ethan, tiene a vidales disposición servicios gratuitos de asistencia lingüística. Llame al [phone].     CHÚ Ý: N?u b?n nói Ti?ng Vi?t, có các d?ch v? h? tr? ngôn ng? mi?n phí dành cho b?n. G?i s? [phone].         [facility] complies with applicable Federal civil rights laws and does not discriminate on the basis of race, color, national origin, age, disability, or sex.

## 2017-02-20 ENCOUNTER — ANESTHESIA EVENT (OUTPATIENT)
Dept: SURGERY | Facility: HOSPITAL | Age: 62
End: 2017-02-20
Payer: COMMERCIAL

## 2017-02-21 ENCOUNTER — TELEPHONE (OUTPATIENT)
Dept: UROLOGY | Facility: CLINIC | Age: 62
End: 2017-02-21

## 2017-02-21 ENCOUNTER — ANESTHESIA (OUTPATIENT)
Dept: SURGERY | Facility: HOSPITAL | Age: 62
End: 2017-02-21
Payer: COMMERCIAL

## 2017-02-21 ENCOUNTER — HOSPITAL ENCOUNTER (OUTPATIENT)
Facility: HOSPITAL | Age: 62
Discharge: HOME | End: 2017-02-21
Attending: UROLOGY | Admitting: UROLOGY
Payer: COMMERCIAL

## 2017-02-21 ENCOUNTER — SURGERY (OUTPATIENT)
Age: 62
End: 2017-02-21

## 2017-02-21 VITALS
WEIGHT: 250.88 LBS | TEMPERATURE: 98 F | BODY MASS INDEX: 32.2 KG/M2 | RESPIRATION RATE: 23 BRPM | HEIGHT: 74 IN | HEART RATE: 77 BPM | SYSTOLIC BLOOD PRESSURE: 141 MMHG | DIASTOLIC BLOOD PRESSURE: 74 MMHG | OXYGEN SATURATION: 96 %

## 2017-02-21 DIAGNOSIS — N20.1 URETERAL STONE: ICD-10-CM

## 2017-02-21 LAB — POCT GLUCOSE: 105 MG/DL (ref 70–110)

## 2017-02-21 PROCEDURE — 25000003 PHARM REV CODE 250: Performed by: NURSE ANESTHETIST, CERTIFIED REGISTERED

## 2017-02-21 PROCEDURE — C1758 CATHETER, URETERAL: HCPCS | Performed by: UROLOGY

## 2017-02-21 PROCEDURE — 82370 X-RAY ASSAY CALCULUS: CPT

## 2017-02-21 PROCEDURE — 36000707: Performed by: UROLOGY

## 2017-02-21 PROCEDURE — 71000033 HC RECOVERY, INTIAL HOUR: Performed by: UROLOGY

## 2017-02-21 PROCEDURE — 25500020 PHARM REV CODE 255: Performed by: UROLOGY

## 2017-02-21 PROCEDURE — 63600175 PHARM REV CODE 636 W HCPCS: Performed by: NURSE ANESTHETIST, CERTIFIED REGISTERED

## 2017-02-21 PROCEDURE — 71000015 HC POSTOP RECOV 1ST HR: Performed by: UROLOGY

## 2017-02-21 PROCEDURE — 63600175 PHARM REV CODE 636 W HCPCS: Performed by: UROLOGY

## 2017-02-21 PROCEDURE — C1773 RET DEV, INSERTABLE: HCPCS | Performed by: UROLOGY

## 2017-02-21 PROCEDURE — C1769 GUIDE WIRE: HCPCS | Performed by: UROLOGY

## 2017-02-21 PROCEDURE — 25000003 PHARM REV CODE 250: Performed by: ANESTHESIOLOGY

## 2017-02-21 PROCEDURE — 36000706: Performed by: UROLOGY

## 2017-02-21 PROCEDURE — 37000009 HC ANESTHESIA EA ADD 15 MINS: Performed by: UROLOGY

## 2017-02-21 PROCEDURE — C2617 STENT, NON-COR, TEM W/O DEL: HCPCS | Performed by: UROLOGY

## 2017-02-21 PROCEDURE — 88300 SURGICAL PATH GROSS: CPT | Mod: 26,,, | Performed by: PATHOLOGY

## 2017-02-21 PROCEDURE — 37000008 HC ANESTHESIA 1ST 15 MINUTES: Performed by: UROLOGY

## 2017-02-21 PROCEDURE — 52356 CYSTO/URETERO W/LITHOTRIPSY: CPT | Mod: LT,,, | Performed by: UROLOGY

## 2017-02-21 PROCEDURE — 88300 SURGICAL PATH GROSS: CPT | Performed by: PATHOLOGY

## 2017-02-21 PROCEDURE — 27201423 OPTIME MED/SURG SUP & DEVICES STERILE SUPPLY: Performed by: UROLOGY

## 2017-02-21 PROCEDURE — 76000 FLUOROSCOPY <1 HR PHYS/QHP: CPT | Mod: 26,59,, | Performed by: UROLOGY

## 2017-02-21 PROCEDURE — 74400 UROGRAPHY IV +-KUB TOMOG: CPT | Mod: 26,,, | Performed by: UROLOGY

## 2017-02-21 DEVICE — STENT URETERAL UNIV 6FR 28CM: Type: IMPLANTABLE DEVICE | Site: URETER | Status: FUNCTIONAL

## 2017-02-21 RX ORDER — OXYCODONE AND ACETAMINOPHEN 5; 325 MG/1; MG/1
1-2 TABLET ORAL EVERY 4 HOURS PRN
Qty: 30 TABLET | Refills: 0 | Status: SHIPPED | OUTPATIENT
Start: 2017-02-21 | End: 2017-03-20

## 2017-02-21 RX ORDER — SODIUM CHLORIDE 9 MG/ML
3 INJECTION, SOLUTION INTRAMUSCULAR; INTRAVENOUS; SUBCUTANEOUS
Status: DISCONTINUED | OUTPATIENT
Start: 2017-02-21 | End: 2017-02-21 | Stop reason: HOSPADM

## 2017-02-21 RX ORDER — MIDAZOLAM HYDROCHLORIDE 1 MG/ML
INJECTION, SOLUTION INTRAMUSCULAR; INTRAVENOUS
Status: DISCONTINUED | OUTPATIENT
Start: 2017-02-21 | End: 2017-02-21

## 2017-02-21 RX ORDER — SUCCINYLCHOLINE CHLORIDE 20 MG/ML
INJECTION INTRAMUSCULAR; INTRAVENOUS
Status: DISCONTINUED | OUTPATIENT
Start: 2017-02-21 | End: 2017-02-21

## 2017-02-21 RX ORDER — FENTANYL CITRATE 50 UG/ML
INJECTION, SOLUTION INTRAMUSCULAR; INTRAVENOUS
Status: DISCONTINUED | OUTPATIENT
Start: 2017-02-21 | End: 2017-02-21

## 2017-02-21 RX ORDER — CIPROFLOXACIN 500 MG/1
500 TABLET ORAL EVERY 12 HOURS
Qty: 6 TABLET | Refills: 0 | Status: SHIPPED | OUTPATIENT
Start: 2017-02-21 | End: 2017-02-24

## 2017-02-21 RX ORDER — NEOSTIGMINE METHYLSULFATE 1 MG/ML
INJECTION, SOLUTION INTRAVENOUS
Status: DISCONTINUED | OUTPATIENT
Start: 2017-02-21 | End: 2017-02-21

## 2017-02-21 RX ORDER — PROPOFOL 10 MG/ML
VIAL (ML) INTRAVENOUS
Status: DISCONTINUED | OUTPATIENT
Start: 2017-02-21 | End: 2017-02-21

## 2017-02-21 RX ORDER — OXYCODONE HYDROCHLORIDE 5 MG/1
5 TABLET ORAL
Status: DISCONTINUED | OUTPATIENT
Start: 2017-02-21 | End: 2017-02-21 | Stop reason: HOSPADM

## 2017-02-21 RX ORDER — FUROSEMIDE 10 MG/ML
INJECTION INTRAMUSCULAR; INTRAVENOUS
Status: DISCONTINUED | OUTPATIENT
Start: 2017-02-21 | End: 2017-02-21

## 2017-02-21 RX ORDER — HYDROCODONE BITARTRATE AND ACETAMINOPHEN 10; 325 MG/1; MG/1
1 TABLET ORAL EVERY 4 HOURS PRN
Status: DISCONTINUED | OUTPATIENT
Start: 2017-02-21 | End: 2017-02-21 | Stop reason: HOSPADM

## 2017-02-21 RX ORDER — ONDANSETRON 2 MG/ML
INJECTION INTRAMUSCULAR; INTRAVENOUS
Status: DISCONTINUED | OUTPATIENT
Start: 2017-02-21 | End: 2017-02-21

## 2017-02-21 RX ORDER — GLYCOPYRROLATE 0.2 MG/ML
INJECTION INTRAMUSCULAR; INTRAVENOUS
Status: DISCONTINUED | OUTPATIENT
Start: 2017-02-21 | End: 2017-02-21

## 2017-02-21 RX ORDER — HYDROCODONE BITARTRATE AND ACETAMINOPHEN 5; 325 MG/1; MG/1
1 TABLET ORAL EVERY 4 HOURS PRN
Status: DISCONTINUED | OUTPATIENT
Start: 2017-02-21 | End: 2017-02-21 | Stop reason: HOSPADM

## 2017-02-21 RX ORDER — ROCURONIUM BROMIDE 10 MG/ML
INJECTION, SOLUTION INTRAVENOUS
Status: DISCONTINUED | OUTPATIENT
Start: 2017-02-21 | End: 2017-02-21

## 2017-02-21 RX ORDER — CEFAZOLIN SODIUM 2 G/50ML
2 SOLUTION INTRAVENOUS
Status: DISCONTINUED | OUTPATIENT
Start: 2017-02-21 | End: 2017-02-21 | Stop reason: HOSPADM

## 2017-02-21 RX ORDER — MORPHINE SULFATE 10 MG/ML
2 INJECTION INTRAMUSCULAR; INTRAVENOUS; SUBCUTANEOUS EVERY 5 MIN PRN
Status: DISCONTINUED | OUTPATIENT
Start: 2017-02-21 | End: 2017-02-21 | Stop reason: HOSPADM

## 2017-02-21 RX ORDER — DOCUSATE SODIUM 100 MG/1
100 CAPSULE, LIQUID FILLED ORAL 2 TIMES DAILY
Qty: 60 CAPSULE | Refills: 0 | Status: ON HOLD | OUTPATIENT
Start: 2017-02-21 | End: 2020-02-21 | Stop reason: CLARIF

## 2017-02-21 RX ORDER — SODIUM CHLORIDE, SODIUM LACTATE, POTASSIUM CHLORIDE, CALCIUM CHLORIDE 600; 310; 30; 20 MG/100ML; MG/100ML; MG/100ML; MG/100ML
INJECTION, SOLUTION INTRAVENOUS CONTINUOUS
Status: DISCONTINUED | OUTPATIENT
Start: 2017-02-21 | End: 2017-02-21 | Stop reason: HOSPADM

## 2017-02-21 RX ORDER — LIDOCAINE HYDROCHLORIDE 10 MG/ML
1 INJECTION, SOLUTION EPIDURAL; INFILTRATION; INTRACAUDAL; PERINEURAL ONCE
Status: DISCONTINUED | OUTPATIENT
Start: 2017-02-21 | End: 2017-02-21 | Stop reason: HOSPADM

## 2017-02-21 RX ORDER — MEPERIDINE HYDROCHLORIDE 50 MG/ML
12.5 INJECTION INTRAMUSCULAR; INTRAVENOUS; SUBCUTANEOUS ONCE AS NEEDED
Status: DISCONTINUED | OUTPATIENT
Start: 2017-02-21 | End: 2017-02-21 | Stop reason: HOSPADM

## 2017-02-21 RX ORDER — LIDOCAINE HCL/PF 100 MG/5ML
SYRINGE (ML) INTRAVENOUS
Status: DISCONTINUED | OUTPATIENT
Start: 2017-02-21 | End: 2017-02-21

## 2017-02-21 RX ADMIN — CEFAZOLIN SODIUM 2 G: 2 SOLUTION INTRAVENOUS at 08:02

## 2017-02-21 RX ADMIN — MIDAZOLAM HYDROCHLORIDE 2 MG: 1 INJECTION, SOLUTION INTRAMUSCULAR; INTRAVENOUS at 08:02

## 2017-02-21 RX ADMIN — NEOSTIGMINE METHYLSULFATE 4 MG: 1 INJECTION INTRAVENOUS at 10:02

## 2017-02-21 RX ADMIN — PROPOFOL 180 MG: 10 INJECTION, EMULSION INTRAVENOUS at 09:02

## 2017-02-21 RX ADMIN — SODIUM CHLORIDE, SODIUM LACTATE, POTASSIUM CHLORIDE, AND CALCIUM CHLORIDE: 600; 310; 30; 20 INJECTION, SOLUTION INTRAVENOUS at 08:02

## 2017-02-21 RX ADMIN — FENTANYL CITRATE 50 MCG: 50 INJECTION, SOLUTION INTRAMUSCULAR; INTRAVENOUS at 08:02

## 2017-02-21 RX ADMIN — LIDOCAINE HYDROCHLORIDE 60 MG: 20 INJECTION, SOLUTION INTRAVENOUS at 09:02

## 2017-02-21 RX ADMIN — ROCURONIUM BROMIDE 5 MG: 10 INJECTION, SOLUTION INTRAVENOUS at 09:02

## 2017-02-21 RX ADMIN — ONDANSETRON 4 MG: 2 INJECTION, SOLUTION INTRAMUSCULAR; INTRAVENOUS at 08:02

## 2017-02-21 RX ADMIN — SUCCINYLCHOLINE CHLORIDE 120 MG: 20 INJECTION, SOLUTION INTRAMUSCULAR; INTRAVENOUS at 09:02

## 2017-02-21 RX ADMIN — IOHEXOL 100 ML: 300 INJECTION, SOLUTION INTRAVENOUS at 08:02

## 2017-02-21 RX ADMIN — FUROSEMIDE 10 MG: 10 INJECTION, SOLUTION INTRAMUSCULAR; INTRAVENOUS at 10:02

## 2017-02-21 RX ADMIN — GLYCOPYRROLATE 0.6 MG: 0.2 INJECTION, SOLUTION INTRAMUSCULAR; INTRAVENOUS at 10:02

## 2017-02-21 RX ADMIN — ROCURONIUM BROMIDE 20 MG: 10 INJECTION, SOLUTION INTRAVENOUS at 09:02

## 2017-02-21 NOTE — H&P (VIEW-ONLY)
Chief Complaint: Ureteral stone    HPI:   2/16/17: 62 yo man recently went to the ER with left flank pain and CT revealed a 10mm left proximal ureteral stone at the pelvic brim.   Referred by Dr. Ramirez. No other abd/pelvic pain and no exac/rel factors.  No gross hematuria.  No prior urolithiasis.  No urinary bother.  No  history.  Leaving Saturday to go to [place] for 8 weeks; works at [employer] lately.  PSA normal last year normal and PCP at home does his prostate cancer screening.    Allergies:  Review of patient's allergies indicates no known allergies.    Medications:  has a current medication list which includes the following prescription(s): alendronate-vitamin d3, allopurinol, amlodipine, aspirin, b complex vitamins, ergocalciferol, fish oil-omega-3 fatty acids, gabapentin, gabapentin, hydrochlorothiazide, hydrocodone-acetaminophen 10-325mg, hydromorphone, ketorolac, lisinopril, multivitamin, oxycodone-acetaminophen, pravastatin, and tamsulosin.    Review of Systems:  General: No fever, chills, fatigability, or weight loss.  Skin: No rashes, itching, or changes in color or texture of skin.  Chest: Denies HARDWICK, cyanosis, wheezing, cough, and sputum production.  Abdomen: Appetite fine. No weight loss. Denies diarrhea, abdominal pain, hematemesis, or blood in stool.  Musculoskeletal: No joint stiffness or swelling. Denies back pain.  : As above.  All other review of systems negative.    PMH:   has a past medical history of Diabetes mellitus, type 2 (4/20/2016); Disc herniation; Essential hypertension (2/18/2016); History of gout (2/18/2016); Hypercholesterolemia (2/18/2016); Hyperlipemia; Hypertension; and Nonrheumatic aortic insufficiency with aortic stenosis (2/18/2016).    PSH:   has a past surgical history that includes Hernia repair.    FamHx: family history is not on file.    SocHx:  reports that he has never smoked. He has never used smokeless tobacco. He reports that he drinks alcohol. He  reports that he does not use illicit drugs.      Physical Exam:  There were no vitals filed for this visit.  General: A&Ox3, no apparent distress, no deformities  Neck: No masses, normal thyroid  Lungs: normal inspiration, no use of accessory muscles  Heart: normal pulse, no arrhythmias  Abdomen: Soft, NT, ND, no masses, no hernias, no hepatosplenomegaly  Lymphatic: Neck and groin nodes negative  Skin: The skin is warm and dry. No jaundice.  Ext: No c/c/e.  : Test desc conchis, no abnormalities of epididymus. Penis normal, with normal penile and scrotal skin. Meatus normal.     Labs/Studies: Urinalysis performed in clinic, summary: UA normal exc tr blood    Impression/Plan:   1. Discussed prevention measures.  Risks/benefits of left ESWL discussed and consents on chart.

## 2017-02-21 NOTE — ANESTHESIA POSTPROCEDURE EVALUATION
"Anesthesia Post Evaluation    Patient: Dominguez Hutton    Procedure(s) Performed: Procedure(s) (LRB):  LITHOTRIPSY-EXTRACORPOREAL SHOCK WAVE (Left)  EXTRACTION-STONE-URETEROSCOPY (Left)  LITHOTRIPSY-LASER (Left)  URETHROSCOPY (Left)  CYSTOSCOPY WITH STENT PLACEMENT (Left)    Final Anesthesia Type: general  Patient location during evaluation: PACU  Patient participation: Yes- Able to Participate  Level of consciousness: awake and alert  Post-procedure vital signs: reviewed and stable  Pain management: adequate  Airway patency: patent  PONV status at discharge: No PONV  Anesthetic complications: no      Cardiovascular status: blood pressure returned to baseline  Respiratory status: unassisted  Hydration status: euvolemic  Follow-up not needed.        Visit Vitals    BP (!) 141/74    Pulse 77    Temp 36.7 °C (98.1 °F) (Temporal)    Resp (!) 23    Ht 6' 2" (1.88 m)    Wt 113.8 kg (250 lb 14.1 oz)    SpO2 96%    BMI 32.21 kg/m2       Pain/Kota Score: Pain Assessment Performed: Yes (2/21/2017  7:05 AM)  Presence of Pain: denies (2/21/2017 11:30 AM)  Kota Score: 10 (2/21/2017 11:30 AM)      "

## 2017-02-21 NOTE — DISCHARGE INSTRUCTIONS
PULL STRING 2 WEEKS FROM NOW TO REMOVE STENT.     General Information:    1.  Do not drink alcoholic beverages including beer for 24 hours or as long as you are on pain medication..  2.  Do not drive a motor vehicle, operate machinery or power tools, or signs legal papers for 24 hours or as long as you are on pain medication.   3.  You may experience light-headedness, dizziness, and sleepiness following surgery. Please do not stay alone. A responsible adult should be with you for this 24 hour period.  4.  Go home and rest.  5. Progress slowly to a normal diet unless instructed.  Otherwise, begin with liquids such as soft drinks, then soup and crackers working up to solid foods. Drink plenty of nonalcoholic fluids.  6.  Certain anesthetics and pain medications produce nausea and vomiting in certain       individuals. If nausea becomes a problem at home, call you doctor.    7. A nurse will be calling you sometime after surgery. Do not be alarmed. This is our way of finding out how you are doing.    8. Several times every hour while you are awake, take 2-3 deep breaths and cough. If you had stomach surgery hold a pillow or rolled towel firmly against your stomach before you cough. This will help with any pain the cough might cause.  9. Several times every hour while you are awake, pump and flex your feet 5-6 times and do foot circles. This will help prevent blood clots.  10.Call your doctor for severe pain, bleeding, fever, or signs or symptoms of infection (pain, swelling, redness, foul odor, drainage).  11.You can contact your doctor anytime by [phone] for the [facility] (at [facility]) or [phone] for the [facility] on [facility].   [email or web address] is another way to contact your doctor if you are an active participant online with [facility].      Ciprofloxacin tablets  What is this medicine?  CIPROFLOXACIN (sip elaina FLOX a sin) is a quinolone antibiotic. It is used to treat certain  kinds of bacterial infections. It will not work for colds, flu, or other viral infections.  How should I use this medicine?  Take this medicine by mouth with a glass of water. Follow the directions on the prescription label. Take your medicine at regular intervals. Do not take your medicine more often than directed. Take all of your medicine as directed even if you think your are better. Do not skip doses or stop your medicine early.  You can take this medicine with food or on an empty stomach. It can be taken with a meal that contains dairy or calcium, but do not take it alone with a dairy product, like milk or yogurt or calcium-fortified juice.  A special MedGuide will be given to you by the pharmacist with each prescription and refill. Be sure to read this information carefully each time.  Talk to your pediatrician regarding the use of this medicine in children. Special care may be needed.  What side effects may I notice from receiving this medicine?  Side effects that you should report to your doctor or health care professional as soon as possible:  · allergic reactions like skin rash or hives, swelling of the face, lips, or tongue  · anxious  · confusion  · depressed mood  · diarrhea  · fast, irregular heartbeat  · hallucination, loss of contact with reality  · joint, muscle, or tendon pain or swelling  · pain, tingling, numbness in the hands or feet  · suicidal thoughts or other mood changes  · sunburn  · unusually weak or tired  Side effects that usually do not require medical attention (report to your doctor or health care professional if they continue or are bothersome):  · dry mouth  · headache  · nausea  · trouble sleeping  What may interact with this medicine?  Do not take this medicine with any of the following medications:  · cisapride  · droperidol  · terfenadine  · tizanidine  This medicine may also interact with the following medications:  · antacids  · birth control  pills  · caffeine  · cyclosporin  · didanosine (ddI) buffered tablets or powder  · medicines for diabetes  · medicines for inflammation like ibuprofen, naproxen  · methotrexate  · multivitamins  · omeprazole  · phenytoin  · probenecid  · sucralfate  · theophylline  · warfarin  What if I miss a dose?  If you miss a dose, take it as soon as you can. If it is almost time for your next dose, take only that dose. Do not take double or extra doses.  Where should I keep my medicine?  Keep out of the reach of children.  Store at room temperature below 30 degrees C (86 degrees F). Keep container tightly closed. Throw away any unused medicine after the expiration date.  What should I tell my health care provider before I take this medicine?  They need to know if you have any of these conditions:  · bone problems  · cerebral disease  · history of low levels of potassium in the blood  · joint problems  · irregular heartbeat  · kidney disease  · myasthenia gravis  · seizures  · tendon problems  · tingling of the fingers or toes, or other nerve disorder  · an unusual or allergic reaction to ciprofloxacin, other antibiotics or medicines, foods, dyes, or preservatives  · pregnant or trying to get pregnant  · breast-feeding  What should I watch for while using this medicine?  Tell your doctor or health care professional if your symptoms do not improve.  Do not treat diarrhea with over the counter products. Contact your doctor if you have diarrhea that lasts more than 2 days or if it is severe and watery.  You may get drowsy or dizzy. Do not drive, use machinery, or do anything that needs mental alertness until you know how this medicine affects you. Do not stand or sit up quickly, especially if you are an older patient. This reduces the risk of dizzy or fainting spells.  This medicine can make you more sensitive to the sun. Keep out of the sun. If you cannot avoid being in the sun, wear protective clothing and use sunscreen. Do not  use sun lamps or tanning beds/booths.  Avoid antacids, aluminum, calcium, iron, magnesium, and zinc products for 6 hours before and 2 hours after taking a dose of this medicine.  Date Last Reviewed:   NOTE:This sheet is a summary. It may not cover all possible information. If you have questions about this medicine, talk to your doctor, pharmacist, or health care provider. Copyright© 2016 [employer]        Docusate capsules  What is this medicine?  DOCUSATE (doc CUE sayt) is stool softener. It helps prevent constipation and straining or discomfort associated with hard or dry stools.  How should I use this medicine?  Take this medicine by mouth with a glass of water. Follow the directions on the label. Take your doses at regular intervals. Do not take your medicine more often than directed.  Talk to your pediatrician regarding the use of this medicine in children. While this medicine may be prescribed for children as young as 2 years for selected conditions, precautions do apply.  What side effects may I notice from receiving this medicine?  Side effects that you should report to your doctor or health care professional as soon as possible:  · allergic reactions like skin rash, itching or hives, swelling of the face, lips, or tongue  Side effects that usually do not require medical attention (report to your doctor or health care professional if they continue or are bothersome):  · diarrhea  · stomach cramps  · throat irritation  What may interact with this medicine?  · mineral oil  What if I miss a dose?  If you miss a dose, take it as soon as you can. If it is almost time for your next dose, take only that dose. Do not take double or extra doses.  Where should I keep my medicine?  Keep out of the reach of children.  Store at room temperature between 15 and 30 degrees C (59 and 86 degrees F). Throw away any unused medicine after the expiration date.  What should I tell my health care provider before I take this  medicine?  They need to know if you have any of these conditions:  · nausea or vomiting  · severe constipation  · stomach pain  · sudden change in bowel habit lasting more than 2 weeks  · an unusual or allergic reaction to docusate, other medicines, foods, dyes, or preservatives  · pregnant or trying to get pregnant  · breast-feeding  What should I watch for while using this medicine?  Do not use for more than one week without advice from your doctor or health care professional. If your constipation returns, check with your doctor or health care professional.  Drink plenty of water while taking this medicine. Drinking water helps decrease constipation.  Stop using this medicine and contact your doctor or health care professional if you experience any rectal bleeding or do not have a bowel movement after use. These could be signs of a more serious condition.  Date Last Reviewed:   NOTE:This sheet is a summary. It may not cover all possible information. If you have questions about this medicine, talk to your doctor, pharmacist, or health care provider. Copyright© 2016 [employer]

## 2017-02-21 NOTE — ANESTHESIA RELEASE NOTE
"Anesthesia Release from PACU Note    Patient: Dominguez Hutton    Procedure(s) Performed: Procedure(s) (LRB):  LITHOTRIPSY-EXTRACORPOREAL SHOCK WAVE (Left)  EXTRACTION-STONE-URETEROSCOPY (Left)  LITHOTRIPSY-LASER (Left)  URETHROSCOPY (Left)  CYSTOSCOPY WITH STENT PLACEMENT (Left)    Anesthesia type: general    Post pain: Adequate analgesia    Post assessment: no apparent anesthetic complications, tolerated procedure well and no evidence of recall    Last Vitals:   Visit Vitals    BP (!) 168/77    Pulse 96    Temp 36.9 °C (98.4 °F) (Temporal)    Resp 18    Ht 6' 2" (1.88 m)    Wt 113.8 kg (250 lb 14.1 oz)    SpO2 95%    BMI 32.21 kg/m2       Post vital signs: stable    Level of consciousness: responds to stimulation    Nausea/Vomiting: no nausea/no vomiting    Complications: none    Airway Patency: patent    Respiratory: unassisted    Cardiovascular: stable and blood pressure at baseline    Hydration: euvolemic  "

## 2017-02-21 NOTE — PLAN OF CARE
Attempted to perform left ESWL in room 2, unsuccessful. Moved patient to room 7 to perform a left ureteroscopy per Dr. Marie's request.

## 2017-02-21 NOTE — DISCHARGE SUMMARY
Date of Discharge: 02/21/2017     Principle Diagnosis: Left ureteral stone    Secondary Diagnosis:  has a past medical history of Diabetes mellitus, type 2 (4/20/2016); Disc herniation; Essential hypertension (2/18/2016); History of gout (2/18/2016); Hypercholesterolemia (2/18/2016); Hyperlipemia; Hypertension; and Nonrheumatic aortic insufficiency with aortic stenosis (2/18/2016).    History of Present Illness: Pt was worked up in clinic and today's procedure was scheduled.  Please see H&P for full details.    Hospital Course: Pt presented on the day of surgery and after proper consents were obtained he was brought to the OR where his procedure was performed without difficulty.    Discharge Disposition: Home    Followup Plan:  1. Pt is provided with medications for pain and others as indicated.  2. RTC in 3 weeks with KUB/US; pull string to remove stent 2 wks

## 2017-02-21 NOTE — INTERVAL H&P NOTE
The patient has been examined and the H&P has been reviewed:    I concur with the findings and no changes have occurred since H&P was written.    Anesthesia/Surgery risks, benefits and alternative options discussed and understood by patient/family.          Active Hospital Problems    Diagnosis  POA    Ureteral stone [N20.1]  Yes      Resolved Hospital Problems    Diagnosis Date Resolved POA   No resolved problems to display.

## 2017-02-21 NOTE — OP NOTE
Date of Procedure: 02/21/2017    PREOPERATIVE DIAGNOSIS:  Left ureteral stone.                                                                                                           POSTOPERATIVE DIAGNOSIS:  Left ureteral stone.                               PROCEDURES:                                                                  1.  Left ureteroscopy with laser lithotripsy and stone basket extraction.                          2.  Cystoscopy with placement of left double-J ureteral stent.              3.  Intravenous pyelogram.                                               4.  Fluoro less than 1 hour.                                                 SURGEON:  Eleazar Marie IV, M.D.                                          Assistants: None    Specimen: None    Prosthetics, Devices, Grafts: None    ANESTHESIA:  General endotracheal.                                           FINDINGS:  The patient had an approximately 7mm stone in the mid-left ureter which was obstructing.  Attempt was made with IVP to localize the stone on the fluoro table and it was not identified.  Pt was moved to adjacent room. Ureteroscopy was possible after placement of an appropriate guidewire. The stone was broken into small pieces. A stone basket was used, and all significant pieces were removed and brought to the back table for pathologic examination.  A 6-Thai x 28 cm double-J ureteral stent was placed. Flouroscopy was used throughout the case for wire and scope guidance, and if applicable to check final stent placement.                                    BLOOD LOSS:  None.                                                           BLOOD GIVEN:  None.                                                          URINE OUTPUT:  None.                                                         DRAINS:  6-Thai x 28 cm left double-J ureteral stent.                      PROCEDURE IN DETAIL:  After proper consents were obtained, the patient was  brought to the room where an attempt was made to locate the stone with the ESWL table fluoro.  Despite intravenous pyelogram with 100 ml of water soluble contrast the stone could not be identified.  He was transferred to an adjacent operating room where he was prepped and draped in normal sterile fashion and provided general endotracheal anesthesia in dorsal lithotomy position.  A 22-[place] cystoscope was assembled and introduced per urethra and the bladder was rinsed and drained.  Fluoroscopy was used to evaluate stone position at the start of the case and throughout the case for wire and scope guidance.    An attempt was made to gently pass a guidewire up the ureter and then the cystoscope was set aside with the guidewire in place.      The semirigid ureteroscope was then brought to bear and easily passed into the ureteral orifice.  The stone was obstructing the ureter and observed at the UPJ, probably from water pressure moving the stone up the widely open proximal ureter.  A tipless basket brought it to the mid-ureter but could go no further.  The medium laser fiber was brought in and with 1 joules of energy and 5 repetitions per second, the stone was broken into a large number of small pieces.  It was observed that the pieces would not pass easily through the distal ureter, so a stone basket was used and with multiple passes into the ureter, the larger stone fragments were brought to the bladder or back table.  The ureteroscope was then set aside and the cystoscope replaced into the bladder over the wire.  A double-J ureteral stent was advanced coaxially over the wire and up to the renal pelvis, and there was an excellent curl on both ends when the wire was withdrawn, using fluoroscopic guidance.      String was left attached and later taped to the penis, so the patient can remove the stent at a later date.      The bladder was then rinsed and drained and stone fragments collected for pathologic examination.   After the bladder was drained, cystoscope was withdrawn and set aside.      The pt tolerated all of this well, and there were no complications.  he was awakened and transferred to Recovery in stable condition.

## 2017-02-21 NOTE — TRANSFER OF CARE
"Anesthesia Transfer of Care Note    Patient: Dominguez Hutton    Procedure(s) Performed: Procedure(s) (LRB):  LITHOTRIPSY-EXTRACORPOREAL SHOCK WAVE (Left)  EXTRACTION-STONE-URETEROSCOPY (Left)  LITHOTRIPSY-LASER (Left)  URETHROSCOPY (Left)  CYSTOSCOPY WITH STENT PLACEMENT (Left)    Patient location: PACU    Anesthesia Type: general    Transport from OR: Transported from OR on room air with adequate spontaneous ventilation    Post pain: adequate analgesia    Post assessment: no apparent anesthetic complications    Post vital signs: stable    Level of consciousness: responds to stimulation    Nausea/Vomiting: no nausea/vomiting    Complications: none          Last vitals:   Visit Vitals    BP (!) 168/77    Pulse 96    Temp 36.9 °C (98.4 °F) (Temporal)    Resp 18    Ht 6' 2" (1.88 m)    Wt 113.8 kg (250 lb 14.1 oz)    SpO2 95%    BMI 32.21 kg/m2     "

## 2017-02-21 NOTE — ANESTHESIA PREPROCEDURE EVALUATION
"                                                                                                             02/21/2017  Dominguez Hutton is a 61 y.o., male.    [facility] Anesthesia Evaluation    I have reviewed the Patient Summary Reports.    I have reviewed the Nursing Notes.   I have reviewed the Medications.     Review of Systems  Anesthesia Hx:  No problems with previous Anesthesia  Denies Family Hx of Anesthesia complications.   Denies Personal Hx of Anesthesia complications.   Social:  Non-Smoker, No Alcohol Use    Cardiovascular:   Exercise tolerance: good Hypertension, well controlled Denies MI.  Denies CAD.    Denies Dysrhythmias.  hyperlipidemia ECG has been reviewed. EKG 2/16/2017  Sinus rhythm with 1st degree A-V block  Left anterior fascicular block  LVH with QRS widening  Abnormal ECG  When compared with ECG of 18-JAN-2011 11:39,  No significant change was found  Confirmed by JEREMY MONTOYA MD ([id number]) on 2/18/2017 3:46:40 PM     Pulmonary:  Pulmonary Normal    Renal/:   renal calculi    Hepatic/GI:   Denies GERD. Denies Liver Disease.    Musculoskeletal:   Arthritis  Gout  Lumbar spine pain   Neurological:   Neuromuscular Disease,   Chronic Pain Syndrome   Endocrine:   Denies Diabetes. "borderline" diabetes       Physical Exam  General:  Obesity    Airway/Jaw/Neck:  Airway Findings: Mouth Opening: Normal Tongue: Normal  General Airway Assessment: Adult  Mallampati: II  TM Distance: Normal, at least 6 cm       Chest/Lungs:  Chest/Lungs Findings: Clear to auscultation     Heart/Vascular:  Heart Findings: Rate: Normal  Rhythm: Regular Rhythm  Sounds: Normal             Anesthesia Plan  Type of Anesthesia, risks & benefits discussed:  Anesthesia Type:  MAC  Patient's Preference:   Intra-op Monitoring Plan:   Intra-op Monitoring Plan Comments:   Post Op Pain Control Plan:   Post Op Pain Control Plan Comments:   Induction:    Beta Blocker:  Patient is not currently on a Beta-Blocker (No further documentation " required).       Informed Consent: Patient understands risks and agrees with Anesthesia plan.  Questions answered. Anesthesia consent signed with patient.  ASA Score: 3     Day of Surgery Review of History & Physical: I have interviewed and examined the patient. I have reviewed the patient's H&P dated:  There are no significant changes.          Ready For Surgery From Anesthesia Perspective.

## 2017-02-21 NOTE — IP AVS SNAPSHOT
[address] [place] [address]           Patient Discharge Instructions     Our goal is to set you up for success. This packet includes information on your condition, medications, and your home care. It will help you to care for yourself so you don't get sicker and need to go back to the hospital.     Please ask your nurse if you have any questions.        There are many details to remember when preparing to leave the hospital. Here is what you will need to do:    1. Take your medicine. If you are prescribed medications, review your Medication List in the following pages. You may have new medications to  at the pharmacy and others that you'll need to stop taking. Review the instructions for how and when to take your medications. Talk with your doctor or nurses if you are unsure of what to do.     2. Go to your follow-up appointments. Specific follow-up information is listed in the following pages. Your may be contacted by a transition nurse or clinical provider about future appointments. Be sure we have all of the phone numbers to reach you, if needed. Please contact your provider's office if you are unable to make an appointment.     3. Watch for warning signs. Your doctor or nurse will give you detailed warning signs to watch for and when to call for assistance. These instructions may also include educational information about your condition. If you experience any of warning signs to your health, call your doctor.               ** Verify the list of medication(s) below is accurate and up to date. Carry this with you in case of emergency. If your medications have changed, please notify your healthcare provider.             Medication List      START taking these medications        Additional Info                      ciprofloxacin HCl 500 MG tablet   Commonly known as:  CIPRO   Quantity:  6 tablet   Refills:  0   Dose:  500 mg    Instructions:  Take 1 tablet  (500 mg total) by mouth every 12 (twelve) hours.     Begin Date    AM    Noon    PM    Bedtime       docusate sodium 100 MG capsule   Commonly known as:  COLACE   Quantity:  60 capsule   Refills:  0   Dose:  100 mg    Instructions:  Take 1 capsule (100 mg total) by mouth 2 (two) times daily.     Begin Date    AM    Noon    PM    Bedtime         CHANGE how you take these medications        Additional Info                      * oxycodone-acetaminophen  mg per tablet   Commonly known as:  PERCOCET   Quantity:  12 tablet   Refills:  0   Dose:  1 tablet   What changed:  Another medication with the same name was added. Make sure you understand how and when to take each.    Instructions:  Take 1 tablet by mouth every 4 (four) hours as needed for Pain.     Begin Date    AM    Noon    PM    Bedtime       * oxycodone-acetaminophen 5-325 mg per tablet   Commonly known as:  PERCOCET   Quantity:  30 tablet   Refills:  0   Dose:  1-2 tablet   What changed:  You were already taking a medication with the same name, and this prescription was added. Make sure you understand how and when to take each.    Instructions:  Take 1-2 tablets by mouth every 4 (four) hours as needed for Pain.     Begin Date    AM    Noon    PM    Bedtime       * Notice:  This list has 2 medication(s) that are the same as other medications prescribed for you. Read the directions carefully, and ask your doctor or other care provider to review them with you.      CONTINUE taking these medications        Additional Info                      alendronate-vitamin D3 70 mg- 2,800 unit per tablet   Commonly known as:  FOSAMAX PLUS D   Refills:  0   Dose:  1 tablet    Instructions:  Take 1 tablet by mouth every 7 days.     Begin Date    AM    Noon    PM    Bedtime       allopurinol 300 MG tablet   Commonly known as:  ZYLOPRIM   Refills:  0   Dose:  300 mg    Instructions:  Take 300 mg by mouth once daily.     Begin Date    AM    Noon    PM    Bedtime        amlodipine 10 MG tablet   Commonly known as:  NORVASC   Quantity:  90 tablet   Refills:  3   Dose:  10 mg    Instructions:  Take 1 tablet (10 mg total) by mouth once daily.     Begin Date    AM    Noon    PM    Bedtime       aspirin 81 MG EC tablet   Commonly known as:  ECOTRIN   Refills:  0   Dose:  81 mg    Instructions:  Take 81 mg by mouth once daily.     Begin Date    AM    Noon    PM    Bedtime       b complex vitamins tablet   Refills:  0   Dose:  1 tablet    Instructions:  Take 1 tablet by mouth once daily.     Begin Date    AM    Noon    PM    Bedtime       ergocalciferol 50,000 unit Cap   Commonly known as:  ERGOCALCIFEROL   Refills:  0   Dose:  14284 Units    Instructions:  Take 50,000 Units by mouth every 7 days.     Begin Date    AM    Noon    PM    Bedtime       fish oil-omega-3 fatty acids 300-1,000 mg capsule   Refills:  0   Dose:  2 g    Instructions:  Take 2 g by mouth once daily.     Begin Date    AM    Noon    PM    Bedtime       * gabapentin 300 MG capsule   Commonly known as:  NEURONTIN   Quantity:  270 capsule   Refills:  0   Dose:  300 mg    Instructions:  Take 1 capsule (300 mg total) by mouth 3 (three) times daily.     Begin Date    AM    Noon    PM    Bedtime       * gabapentin 300 MG capsule   Commonly known as:  NEURONTIN   Quantity:  180 capsule   Refills:  1    Instructions:  Take one in the morning, one in the afternoon and two at bedtime for 5 days.  Then, two in the morning, one in the afternoon and two at bedtime for 5 days.  Then, two in the morning, two in the afternoon and two at bedtime if tolerated.     Begin Date    AM    Noon    PM    Bedtime       hydrochlorothiazide 12.5 mg capsule   Commonly known as:  MICROZIDE   Quantity:  90 capsule   Refills:  3   Dose:  12.5 mg    Instructions:  Take 1 capsule (12.5 mg total) by mouth once daily.     Begin Date    AM    Noon    PM    Bedtime       hydrocodone-acetaminophen 10-325mg  mg Tab   Commonly known as:  NORCO   Refills:   0   Dose:  1 tablet    Instructions:  Take 1 tablet by mouth 3 (three) times daily as needed.     Begin Date    AM    Noon    PM    Bedtime       lisinopril 20 MG tablet   Commonly known as:  PRINIVIL,ZESTRIL   Refills:  0   Dose:  20 mg    Instructions:  Take 20 mg by mouth once daily.     Begin Date    AM    Noon    PM    Bedtime       multivitamin per tablet   Commonly known as:  THERAGRAN   Refills:  0   Dose:  1 tablet    Instructions:  Take 1 tablet by mouth once daily.     Begin Date    AM    Noon    PM    Bedtime       pravastatin 40 MG tablet   Commonly known as:  PRAVACHOL   Refills:  0   Dose:  40 mg    Instructions:  Take 40 mg by mouth every evening.     Begin Date    AM    Noon    PM    Bedtime       tamsulosin 0.4 mg Cp24   Commonly known as:  FLOMAX   Quantity:  30 capsule   Refills:  0   Dose:  0.4 mg    Instructions:  Take 1 capsule (0.4 mg total) by mouth once daily.     Begin Date    AM    Noon    PM    Bedtime       * Notice:  This list has 2 medication(s) that are the same as other medications prescribed for you. Read the directions carefully, and ask your doctor or other care provider to review them with you.      STOP taking these medications     HYDROmorphone 4 MG tablet   Commonly known as:  DILAUDID       ketorolac 10 mg tablet   Commonly known as:  TORADOL            Where to Get Your Medications      [facility] [address] [place] [address] [address] [facility] [address] [facility] [address] [address] [address]    Hours:  24-hours Phone:  [phone]     ciprofloxacin HCl 500 MG tablet    docusate sodium 100 MG capsule    oxycodone-acetaminophen 5-325 mg per tablet                  Please bring to all follow up appointments:    1. A copy of your discharge instructions.  2. All medicines you are currently taking in their original bottles.  3. Identification and insurance card.    Please arrive 15 minutes ahead of scheduled  appointment time.    Please call 24 hours in advance if you must reschedule your appointment and/or time.        Your Scheduled Appointments     Mar 14, 2017 10:00 AM CDT   Diagnostic Xray with ON XR1-   [facility]    [address] [place] [id number]   [phone]            Mar 14, 2017 10:15 AM CDT   Us Retroper with ONLH US1   [facility]    [address] [place] [id number]   [phone]            Mar 20, 2017  1:40 PM CDT   Post OP with MD [facility] ([facility])    [address] [place] [id number]   [phone]                Discharge Instructions     Future Orders    US Retroperitoneal Complete (Kidney and     Process Instructions:    No food or drink after midnight (8 hours before exam time for an afternoon appointment).  Oral medication with a small amount of water the morning before test is acceptable.    Questions:    Reason for Exam:      May the Radiologist modify the order per protocol to meet the clinical needs of the patient?:  Yes    X-Ray Abdomen AP 1 View     Questions:    Reason for Exam:      Activity as tolerated     Call MD for:  persistent nausea and vomiting     Call MD for:  severe uncontrolled pain     Call MD for:  temperature >100.4     Diet general     Questions:    Total calories:      Fat restriction, if any:      Protein restriction, if any:      Na restriction, if any:      Fluid restriction:      Additional restrictions:      Shower on day dressing removed (No bath)         Discharge Instructions         PULL STRING 2 WEEKS FROM NOW TO REMOVE STENT.     General Information:    1.  Do not drink alcoholic beverages including beer for 24 hours or as long as you are on pain medication..  2.  Do not drive a motor vehicle, operate machinery or power tools, or signs legal papers for 24 hours or as long as you are on pain medication.   3.   You may experience light-headedness, dizziness, and sleepiness following surgery. Please do not stay alone. A responsible adult should be with you for this 24 hour period.  4.  Go home and rest.  5. Progress slowly to a normal diet unless instructed.  Otherwise, begin with liquids such as soft drinks, then soup and crackers working up to solid foods. Drink plenty of nonalcoholic fluids.  6.  Certain anesthetics and pain medications produce nausea and vomiting in certain       individuals. If nausea becomes a problem at home, call you doctor.    7. A nurse will be calling you sometime after surgery. Do not be alarmed. This is our way of finding out how you are doing.    8. Several times every hour while you are awake, take 2-3 deep breaths and cough. If you had stomach surgery hold a pillow or rolled towel firmly against your stomach before you cough. This will help with any pain the cough might cause.  9. Several times every hour while you are awake, pump and flex your feet 5-6 times and do foot circles. This will help prevent blood clots.  10.Call your doctor for severe pain, bleeding, fever, or signs or symptoms of infection (pain, swelling, redness, foul odor, drainage).  11.You can contact your doctor anytime by [phone] for the [facility] (at [facility]) or [phone] for the [facility] on [facility].   [email or web address] is another way to contact your doctor if you are an active participant online with [facility].      Ciprofloxacin tablets  What is this medicine?  CIPROFLOXACIN (sip elaina FLOX a sin) is a quinolone antibiotic. It is used to treat certain kinds of bacterial infections. It will not work for colds, flu, or other viral infections.  How should I use this medicine?  Take this medicine by mouth with a glass of water. Follow the directions on the prescription label. Take your medicine at regular intervals. Do not take your medicine more often than directed. Take all of your  medicine as directed even if you think your are better. Do not skip doses or stop your medicine early.  You can take this medicine with food or on an empty stomach. It can be taken with a meal that contains dairy or calcium, but do not take it alone with a dairy product, like milk or yogurt or calcium-fortified juice.  A special MedGuide will be given to you by the pharmacist with each prescription and refill. Be sure to read this information carefully each time.  Talk to your pediatrician regarding the use of this medicine in children. Special care may be needed.  What side effects may I notice from receiving this medicine?  Side effects that you should report to your doctor or health care professional as soon as possible:  · allergic reactions like skin rash or hives, swelling of the face, lips, or tongue  · anxious  · confusion  · depressed mood  · diarrhea  · fast, irregular heartbeat  · hallucination, loss of contact with reality  · joint, muscle, or tendon pain or swelling  · pain, tingling, numbness in the hands or feet  · suicidal thoughts or other mood changes  · sunburn  · unusually weak or tired  Side effects that usually do not require medical attention (report to your doctor or health care professional if they continue or are bothersome):  · dry mouth  · headache  · nausea  · trouble sleeping  What may interact with this medicine?  Do not take this medicine with any of the following medications:  · cisapride  · droperidol  · terfenadine  · tizanidine  This medicine may also interact with the following medications:  · antacids  · birth control pills  · caffeine  · cyclosporin  · didanosine (ddI) buffered tablets or powder  · medicines for diabetes  · medicines for inflammation like ibuprofen, naproxen  · methotrexate  · multivitamins  · omeprazole  · phenytoin  · probenecid  · sucralfate  · theophylline  · warfarin  What if I miss a dose?  If you miss a dose, take it as soon as you can. If it is almost  time for your next dose, take only that dose. Do not take double or extra doses.  Where should I keep my medicine?  Keep out of the reach of children.  Store at room temperature below 30 degrees C (86 degrees F). Keep container tightly closed. Throw away any unused medicine after the expiration date.  What should I tell my health care provider before I take this medicine?  They need to know if you have any of these conditions:  · bone problems  · cerebral disease  · history of low levels of potassium in the blood  · joint problems  · irregular heartbeat  · kidney disease  · myasthenia gravis  · seizures  · tendon problems  · tingling of the fingers or toes, or other nerve disorder  · an unusual or allergic reaction to ciprofloxacin, other antibiotics or medicines, foods, dyes, or preservatives  · pregnant or trying to get pregnant  · breast-feeding  What should I watch for while using this medicine?  Tell your doctor or health care professional if your symptoms do not improve.  Do not treat diarrhea with over the counter products. Contact your doctor if you have diarrhea that lasts more than 2 days or if it is severe and watery.  You may get drowsy or dizzy. Do not drive, use machinery, or do anything that needs mental alertness until you know how this medicine affects you. Do not stand or sit up quickly, especially if you are an older patient. This reduces the risk of dizzy or fainting spells.  This medicine can make you more sensitive to the sun. Keep out of the sun. If you cannot avoid being in the sun, wear protective clothing and use sunscreen. Do not use sun lamps or tanning beds/booths.  Avoid antacids, aluminum, calcium, iron, magnesium, and zinc products for 6 hours before and 2 hours after taking a dose of this medicine.  Date Last Reviewed:   NOTE:This sheet is a summary. It may not cover all possible information. If you have questions about this medicine, talk to your doctor, pharmacist, or health care  provider. Copyright© 2016 [employer]        Docusate capsules  What is this medicine?  DOCUSATE (doc CUE sayt) is stool softener. It helps prevent constipation and straining or discomfort associated with hard or dry stools.  How should I use this medicine?  Take this medicine by mouth with a glass of water. Follow the directions on the label. Take your doses at regular intervals. Do not take your medicine more often than directed.  Talk to your pediatrician regarding the use of this medicine in children. While this medicine may be prescribed for children as young as 2 years for selected conditions, precautions do apply.  What side effects may I notice from receiving this medicine?  Side effects that you should report to your doctor or health care professional as soon as possible:  · allergic reactions like skin rash, itching or hives, swelling of the face, lips, or tongue  Side effects that usually do not require medical attention (report to your doctor or health care professional if they continue or are bothersome):  · diarrhea  · stomach cramps  · throat irritation  What may interact with this medicine?  · mineral oil  What if I miss a dose?  If you miss a dose, take it as soon as you can. If it is almost time for your next dose, take only that dose. Do not take double or extra doses.  Where should I keep my medicine?  Keep out of the reach of children.  Store at room temperature between 15 and 30 degrees C (59 and 86 degrees F). Throw away any unused medicine after the expiration date.  What should I tell my health care provider before I take this medicine?  They need to know if you have any of these conditions:  · nausea or vomiting  · severe constipation  · stomach pain  · sudden change in bowel habit lasting more than 2 weeks  · an unusual or allergic reaction to docusate, other medicines, foods, dyes, or preservatives  · pregnant or trying to get pregnant  · breast-feeding  What should I watch for while  "using this medicine?  Do not use for more than one week without advice from your doctor or health care professional. If your constipation returns, check with your doctor or health care professional.  Drink plenty of water while taking this medicine. Drinking water helps decrease constipation.  Stop using this medicine and contact your doctor or health care professional if you experience any rectal bleeding or do not have a bowel movement after use. These could be signs of a more serious condition.  Date Last Reviewed:   NOTE:This sheet is a summary. It may not cover all possible information. If you have questions about this medicine, talk to your doctor, pharmacist, or health care provider. Copyright© 2016 [employer]                Primary Diagnosis     Your primary diagnosis was:  Urinary Tract Stone      Admission Information     Date & Time Provider Department CSN    2/21/2017  6:34 AM Baljinder Marie IV, MD [facility] -  [id number]      Care Providers     Provider Role Specialty Primary office phone    Baljinder Marie IV, MD Attending Provider Urology [phone]    Baljinder Marie IV, MD Surgeon  Urology [phone]      Your Vitals Were     BP Pulse Temp Resp Height Weight    141/74 77 98.1 °F (36.7 °C) (Temporal) 23 6' 2" (1.88 m) 113.8 kg (250 lb 14.1 oz)    SpO2 BMI             96% 32.21 kg/m2         Recent Lab Values     No lab values to display.      Pending Labs     Order Current Status    Specimen to Pathology - Surgery Collected (02/21/17 1017)    Urinary Stone Analysis In process      Allergies as of 2/21/2017     No Known Allergies      [facility]     [facility] Care Line - 24/7 Assistance  Unless otherwise directed by your provider, please contact [facility], our nurse care line that is available for 24/7 assistance.     Registered nurses in the [facility] provide clinical advisement, health education, appointment booking, and other advisory " services.  Call for this free service at [phone].        Advance Directives     An advance directive is a document which, in the event you are no longer able to make decisions for yourself, tells your healthcare team what kind of treatment you do or do not want to receive, or who you would like to make those decisions for you.  If you do not currently have an advance directive, [facility] encourages you to create one.  For more information call:  [phone]), [phone] ([phone]),  or log on to [email or web address].        Language Assistance Services     ATTENTION: Language assistance services are available, free of charge. Please call [phone].      ATENCIÓN: Si habla español, tiene a vidales disposición servicios gratuitos de asistencia lingüística. Llame al [phone].     CHÚ Ý: N?u b?n nói Ti?ng Vi?t, có các d?ch v? h? tr? ngôn ng? mi?n phí dành cho b?n. G?i s? [phone].        Diabetes Discharge Instructions                                    [facility] complies with applicable Federal civil rights laws and does not discriminate on the basis of race, color, national origin, age, disability, or sex.

## 2017-03-10 LAB — URINARY STONE ANALYSIS: NORMAL

## 2017-03-13 ENCOUNTER — TELEPHONE (OUTPATIENT)
Dept: RADIOLOGY | Facility: HOSPITAL | Age: 62
End: 2017-03-13

## 2017-03-14 ENCOUNTER — HOSPITAL ENCOUNTER (OUTPATIENT)
Dept: RADIOLOGY | Facility: HOSPITAL | Age: 62
Discharge: HOME | End: 2017-03-14
Attending: UROLOGY
Payer: COMMERCIAL

## 2017-03-14 DIAGNOSIS — N20.1 URETERAL STONE: ICD-10-CM

## 2017-03-14 PROCEDURE — 76770 US EXAM ABDO BACK WALL COMP: CPT | Mod: 26,,, | Performed by: RADIOLOGY

## 2017-03-14 PROCEDURE — 74000 XR ABDOMEN AP 1 VIEW: CPT | Mod: TC

## 2017-03-14 PROCEDURE — 76770 US EXAM ABDO BACK WALL COMP: CPT | Mod: TC

## 2017-03-14 PROCEDURE — 74000 XR ABDOMEN AP 1 VIEW: CPT | Mod: 26,,, | Performed by: RADIOLOGY

## 2017-03-20 ENCOUNTER — OFFICE VISIT (OUTPATIENT)
Dept: UROLOGY | Facility: CLINIC | Age: 62
End: 2017-03-20
Payer: COMMERCIAL

## 2017-03-20 ENCOUNTER — LAB VISIT (OUTPATIENT)
Dept: LAB | Facility: HOSPITAL | Age: 62
End: 2017-03-20
Attending: UROLOGY
Payer: COMMERCIAL

## 2017-03-20 VITALS
SYSTOLIC BLOOD PRESSURE: 128 MMHG | BODY MASS INDEX: 32.47 KG/M2 | WEIGHT: 252.88 LBS | DIASTOLIC BLOOD PRESSURE: 78 MMHG

## 2017-03-20 DIAGNOSIS — N20.0 RENAL STONES: ICD-10-CM

## 2017-03-20 DIAGNOSIS — N20.0 RENAL STONES: Primary | ICD-10-CM

## 2017-03-20 LAB
ANION GAP SERPL CALC-SCNC: 8 MMOL/L
BUN SERPL-MCNC: 20 MG/DL
CALCIUM SERPL-MCNC: 10.7 MG/DL
CHLORIDE SERPL-SCNC: 101 MMOL/L
CO2 SERPL-SCNC: 30 MMOL/L
CREAT SERPL-MCNC: 1.2 MG/DL
EST. GFR  (AFRICAN AMERICAN): >60 ML/MIN/1.73 M^2
EST. GFR  (NON AFRICAN AMERICAN): >60 ML/MIN/1.73 M^2
GLUCOSE SERPL-MCNC: 84 MG/DL
POTASSIUM SERPL-SCNC: 4.4 MMOL/L
SODIUM SERPL-SCNC: 139 MMOL/L
TSH SERPL DL<=0.005 MIU/L-ACNC: 0.97 UIU/ML
URATE SERPL-MCNC: 7.3 MG/DL

## 2017-03-20 PROCEDURE — 83970 ASSAY OF PARATHORMONE: CPT

## 2017-03-20 PROCEDURE — 82652 VIT D 1 25-DIHYDROXY: CPT

## 2017-03-20 PROCEDURE — 99024 POSTOP FOLLOW-UP VISIT: CPT | Mod: S$GLB,,, | Performed by: UROLOGY

## 2017-03-20 PROCEDURE — 84443 ASSAY THYROID STIM HORMONE: CPT

## 2017-03-20 PROCEDURE — 84550 ASSAY OF BLOOD/URIC ACID: CPT

## 2017-03-20 PROCEDURE — 80048 BASIC METABOLIC PNL TOTAL CA: CPT

## 2017-03-20 PROCEDURE — 99999 PR PBB SHADOW E&M-EST. PATIENT-LVL III: CPT | Mod: PBBFAC,,, | Performed by: UROLOGY

## 2017-03-20 RX ORDER — AMLODIPINE BESYLATE 10 MG/1
TABLET ORAL
Refills: 0 | COMMUNITY
Start: 2017-02-24 | End: 2020-02-18 | Stop reason: SDUPTHER

## 2017-03-20 RX ORDER — TAMSULOSIN HYDROCHLORIDE 0.4 MG/1
0.4 CAPSULE ORAL DAILY
Qty: 30 CAPSULE | Refills: 11 | Status: SHIPPED | OUTPATIENT
Start: 2017-03-20 | End: 2018-03-20

## 2017-03-20 RX ORDER — HYDROCHLOROTHIAZIDE 25 MG/1
TABLET ORAL
Refills: 4 | COMMUNITY
Start: 2017-02-24 | End: 2019-08-13

## 2017-03-20 NOTE — MR AVS SNAPSHOT
Formerly [facility]  [address]  [place] [address]  Phone: [phone]  Fax: [phone]                  Dominguez Hutton   3/20/2017 1:40 PM   Office Visit    Description:  Male : 1955   Provider:  Baljinder Marie IV, MD   Department[facility]  [facility]           Diagnoses this Visit        Comments    Renal stones    -  Primary            To Do List           Future Appointments        Provider Department Dept Phone    3/20/2017 4:00 PM LABORATORY, [address] [facility] [phone]    2017 3:00 PM Baljinder Marie IV, MD [facility] [phone]      Goals (5 Years of Data)     None      [facility]     [facility] -  Assistance  Registered nurses in the [facility] provide clinical advisement, health education, appointment booking, and other advisory services.  Call for this free service at [phone].             Medications           Message regarding Medications     Verify the changes and/or additions to your medication regime listed below are the same as discussed with your clinician today.  If any of these changes or additions are incorrect, please notify your healthcare provider.        STOP taking these medications     oxycodone-acetaminophen (PERCOCET)  mg per tablet Take 1 tablet by mouth every 4 (four) hours as needed for Pain.    oxycodone-acetaminophen (PERCOCET) 5-325 mg per tablet Take 1-2 tablets by mouth every 4 (four) hours as needed for Pain.    oxycodone-acetaminophen (PERCOCET) 5-325 mg per tablet Take 1-2 tablets by mouth every 4 (four) hours as needed for Pain.    tamsulosin (FLOMAX) 0.4 mg Cp24 Take 1 capsule (0.4 mg total) by mouth once daily.           Verify that the below list of medications is an accurate representation of the medications you are currently taking.  If none reported, the list may be blank. If incorrect, please contact your healthcare provider. Carry  this list with you in case of emergency.           Current Medications     allopurinol (ZYLOPRIM) 300 MG tablet Take 300 mg by mouth once daily.     amlodipine (NORVASC) 10 MG tablet TK 1 T PO QAM    aspirin (ECOTRIN) 81 MG EC tablet Take 81 mg by mouth once daily.    b complex vitamins tablet Take 1 tablet by mouth once daily.    docusate sodium (COLACE) 100 MG capsule Take 1 capsule (100 mg total) by mouth 2 (two) times daily.    ergocalciferol (ERGOCALCIFEROL) 50,000 unit Cap Take 50,000 Units by mouth every 7 days.    fish oil-omega-3 fatty acids 300-1,000 mg capsule Take 2 g by mouth once daily.    gabapentin (NEURONTIN) 300 MG capsule Take one in the morning, one in the afternoon and two at bedtime for 5 days.  Then, two in the morning, one in the afternoon and two at bedtime for 5 days.  Then, two in the morning, two in the afternoon and two at bedtime if tolerated.    hydrochlorothiazide (HYDRODIURIL) 25 MG tablet TK 1 T PO QD    hydrocodone-acetaminophen 10-325mg (NORCO)  mg Tab Take 1 tablet by mouth 3 (three) times daily as needed.     lisinopril (PRINIVIL,ZESTRIL) 20 MG tablet Take 20 mg by mouth once daily.     pravastatin (PRAVACHOL) 40 MG tablet Take 40 mg by mouth every evening.     alendronate-vitamin D3 (FOSAMAX PLUS D) 70-2,800 mg-unit per tablet Take 1 tablet by mouth every 7 days.    multivitamin (THERAGRAN) per tablet Take 1 tablet by mouth once daily.           Clinical Reference Information           Your Vitals Were     BP Weight BMI          128/78 114.7 kg (252 lb 13.9 oz) 32.47 kg/m2        Blood Pressure          Most Recent Value    BP  128/78      Allergies as of 3/20/2017     No Known Allergies      Immunizations Administered on Date of Encounter - 3/20/2017     None      Orders Placed During Today's Visit      Normal Orders This Visit    Case Request Operating Room: LITHOTRIPSY-EXTRACORPOREAL SHOCK WAVE     Future Labs/Procedures Expected by Expires    Basic metabolic panel   3/20/2017 5/19/2018    Calcitriol  3/20/2017 5/19/2018    PTH, intact  3/20/2017 5/19/2018    TSH  3/20/2017 5/19/2018    Uric acid  3/20/2017 5/19/2018      Language Assistance Services     ATTENTION: Language assistance services are available, free of charge. Please call [phone].      ATENCIÓN: Si habla español, tiene a vidales disposición servicios gratuitos de asistencia lingüística. Llame al [phone].     CHÚ Ý: N?u b?n nói Ti?ng Vi?t, có các d?ch v? h? tr? ngôn ng? mi?n phí dành cho b?n. G?i s? [phone].         [facility] complies with applicable Federal civil rights laws and does not discriminate on the basis of race, color, national origin, age, disability, or sex.

## 2017-03-21 LAB — PTH-INTACT SERPL-MCNC: 32 PG/ML

## 2017-03-23 LAB — 1,25(OH)2D3 SERPL-MCNC: 40 PG/ML

## 2017-03-30 ENCOUNTER — TELEPHONE (OUTPATIENT)
Dept: UROLOGY | Facility: CLINIC | Age: 62
End: 2017-03-30

## 2017-03-30 NOTE — TELEPHONE ENCOUNTER
Patient was contacted and stated that he needed to cancel his surgery scheduled for April 4, 2017.  Stated he was moving back home and stated that he would follow up [place] in [place]. MD informed.

## 2019-06-26 ENCOUNTER — OFFICE VISIT (OUTPATIENT)
Dept: CARDIOLOGY | Facility: CLINIC | Age: 64
End: 2019-06-26
Attending: INTERNAL MEDICINE
Payer: COMMERCIAL

## 2019-06-26 VITALS
DIASTOLIC BLOOD PRESSURE: 63 MMHG | SYSTOLIC BLOOD PRESSURE: 146 MMHG | WEIGHT: 240 LBS | HEART RATE: 52 BPM | BODY MASS INDEX: 30.8 KG/M2 | HEIGHT: 74 IN

## 2019-06-26 DIAGNOSIS — E11.9 TYPE 2 DIABETES MELLITUS WITHOUT COMPLICATION, WITHOUT LONG-TERM CURRENT USE OF INSULIN: ICD-10-CM

## 2019-06-26 DIAGNOSIS — E66.9 MILD OBESITY: ICD-10-CM

## 2019-06-26 DIAGNOSIS — I35.1 NONRHEUMATIC AORTIC VALVE INSUFFICIENCY: ICD-10-CM

## 2019-06-26 DIAGNOSIS — Z87.39 HISTORY OF GOUT: ICD-10-CM

## 2019-06-26 DIAGNOSIS — R06.02 SHORTNESS OF BREATH: ICD-10-CM

## 2019-06-26 DIAGNOSIS — E78.00 HYPERCHOLESTEROLEMIA: ICD-10-CM

## 2019-06-26 DIAGNOSIS — I10 ESSENTIAL HYPERTENSION: ICD-10-CM

## 2019-06-26 PROCEDURE — 3008F PR BODY MASS INDEX (BMI) DOCUMENTED: ICD-10-PCS | Mod: CPTII,S$GLB,, | Performed by: INTERNAL MEDICINE

## 2019-06-26 PROCEDURE — 3077F SYST BP >= 140 MM HG: CPT | Mod: CPTII,S$GLB,, | Performed by: INTERNAL MEDICINE

## 2019-06-26 PROCEDURE — 3008F BODY MASS INDEX DOCD: CPT | Mod: CPTII,S$GLB,, | Performed by: INTERNAL MEDICINE

## 2019-06-26 PROCEDURE — 99204 PR OFFICE/OUTPT VISIT, NEW, LEVL IV, 45-59 MIN: ICD-10-PCS | Mod: 25,S$GLB,, | Performed by: INTERNAL MEDICINE

## 2019-06-26 PROCEDURE — 3077F PR MOST RECENT SYSTOLIC BLOOD PRESSURE >= 140 MM HG: ICD-10-PCS | Mod: CPTII,S$GLB,, | Performed by: INTERNAL MEDICINE

## 2019-06-26 PROCEDURE — 93000 ELECTROCARDIOGRAM COMPLETE: CPT | Mod: S$GLB,,, | Performed by: INTERNAL MEDICINE

## 2019-06-26 PROCEDURE — 99204 OFFICE O/P NEW MOD 45 MIN: CPT | Mod: 25,S$GLB,, | Performed by: INTERNAL MEDICINE

## 2019-06-26 PROCEDURE — 93000 PR ELECTROCARDIOGRAM, COMPLETE: ICD-10-PCS | Mod: S$GLB,,, | Performed by: INTERNAL MEDICINE

## 2019-06-26 PROCEDURE — 3078F DIAST BP <80 MM HG: CPT | Mod: CPTII,S$GLB,, | Performed by: INTERNAL MEDICINE

## 2019-06-26 PROCEDURE — 3078F PR MOST RECENT DIASTOLIC BLOOD PRESSURE < 80 MM HG: ICD-10-PCS | Mod: CPTII,S$GLB,, | Performed by: INTERNAL MEDICINE

## 2019-06-26 RX ORDER — FUROSEMIDE 20 MG/1
20 TABLET ORAL DAILY
Qty: 90 TABLET | Refills: 3 | Status: SHIPPED | OUTPATIENT
Start: 2019-06-26 | End: 2020-02-18 | Stop reason: SDUPTHER

## 2019-06-26 NOTE — PROGRESS NOTES
Subjective:     Dominguez Hutton is a 63 y.o. male with hypertension, hypercholesterolemia and diet controlled diabetes mellitus type 2. He has a lot of back pains and has received several DANDRE over the last several years. Because of a murmur he underwent an Echocardiogram on 9/9/2015 that revealed some aortic valve disease with mild to moderate aortic regurgitations. In 1/2019 he began experience orthopnea at [facility] that resolved as he is sitting up. No exertional dyspnea or edema of his legs. He denies any exertional chest pain. No palpitations or weak spells. Feeling well except for his back pains.       Hypertension   This is a chronic problem. The current episode started more than 1 year ago. The problem is unchanged. The problem is controlled (usually 130-140/70-80 mmHg at home). Associated symptoms include orthopnea, PND and shortness of breath. Pertinent negatives include no anxiety, blurred vision, chest pain, headaches, malaise/fatigue, neck pain, palpitations, peripheral edema or sweats. There is no history of chronic renal disease.   Hyperlipidemia   This is a chronic problem. The current episode started more than 1 year ago. The problem is controlled. Recent lipid tests were reviewed and are normal. He has no history of chronic renal disease, diabetes, hypothyroidism, liver disease, obesity or nephrotic syndrome. Associated symptoms include shortness of breath. Pertinent negatives include no chest pain, focal sensory loss, focal weakness, leg pain or myalgias.   Shortness of Breath   This is a new problem. The current episode started more than 1 month ago. The problem occurs every several days. The problem has been gradually worsening. Associated symptoms include orthopnea and PND. Pertinent negatives include no abdominal pain, chest pain, claudication, coryza, ear pain, fever, headaches, hemoptysis, leg pain, leg swelling, neck pain, rash, rhinorrhea, sputum production, swollen glands, syncope,  vomiting or wheezing.       Review of Systems   Constitution: Negative for chills, fever, malaise/fatigue, night sweats, weight gain and weight loss.   HENT: Negative for ear pain, nosebleeds and rhinorrhea.    Eyes: Negative for blurred vision, vision loss in left eye and vision loss in right eye.   Cardiovascular: Positive for orthopnea and paroxysmal nocturnal dyspnea. Negative for chest pain, claudication, dyspnea on exertion, irregular heartbeat, leg swelling, near-syncope, palpitations and syncope.   Respiratory: Positive for shortness of breath. Negative for cough, hemoptysis, sleep disturbances due to breathing, sputum production and wheezing.    Endocrine: Negative for cold intolerance and heat intolerance.   Hematologic/Lymphatic: Negative for bleeding problem. Does not bruise/bleed easily.   Skin: Negative for color change and rash.   Musculoskeletal: Positive for back pain and gout. Negative for arthritis, falls, joint swelling, muscle cramps, myalgias and neck pain.   Gastrointestinal: Negative for abdominal pain, heartburn, hematemesis, hematochezia, hemorrhoids, jaundice, melena, nausea and vomiting.   Genitourinary: Negative for dysuria and hematuria.   Neurological: Negative for dizziness, focal weakness, headaches, light-headedness, loss of balance, numbness, paresthesias and vertigo.   Psychiatric/Behavioral: Negative for altered mental status, depression and substance abuse. The patient is not nervous/anxious.    Allergic/Immunologic: Negative for hives and persistent infections.       Current Outpatient Medications on File Prior to Visit   Medication Sig Dispense Refill    alendronate-vitamin D3 (FOSAMAX PLUS D) 70-2,800 mg-unit per tablet Take 1 tablet by mouth every 7 days.      allopurinol (ZYLOPRIM) 300 MG tablet Take 300 mg by mouth once daily.       amlodipine (NORVASC) 10 MG tablet TK 1 T PO QAM  0    aspirin (ECOTRIN) 81 MG EC tablet Take 81 mg by mouth once daily.      b complex  "vitamins tablet Take 1 tablet by mouth once daily.      docusate sodium (COLACE) 100 MG capsule Take 1 capsule (100 mg total) by mouth 2 (two) times daily. 60 capsule 0    ergocalciferol (ERGOCALCIFEROL) 50,000 unit Cap Take 50,000 Units by mouth every 7 days.      fish oil-omega-3 fatty acids 300-1,000 mg capsule Take 2 g by mouth once daily.      gabapentin (NEURONTIN) 300 MG capsule Take one in the morning, one in the afternoon and two at bedtime for 5 days.  Then, two in the morning, one in the afternoon and two at bedtime for 5 days.  Then, two in the morning, two in the afternoon and two at bedtime if tolerated. 180 capsule 1    hydrochlorothiazide (HYDRODIURIL) 25 MG tablet TK 1 T PO QD  4    hydrocodone-acetaminophen 10-325mg (NORCO)  mg Tab Take 1 tablet by mouth 3 (three) times daily as needed.   0    lisinopril (PRINIVIL,ZESTRIL) 20 MG tablet Take 20 mg by mouth once daily.   0    multivitamin (THERAGRAN) per tablet Take 1 tablet by mouth once daily.      pravastatin (PRAVACHOL) 40 MG tablet Take 40 mg by mouth every evening.       tamsulosin (FLOMAX) 0.4 mg Cp24 Take 1 capsule (0.4 mg total) by mouth once daily. 30 capsule 11     No current facility-administered medications on file prior to visit.        BP (!) 146/63   Pulse (!) 52   Ht 6' 2" (1.88 m)   Wt 108.9 kg (240 lb)   BMI 30.81 kg/m²       Objective:     Physical Exam   Constitutional: He is oriented to person, place, and time. He appears well-developed and well-nourished. No distress.   HENT:   Head: Normocephalic and atraumatic.   Mouth/Throat: No oropharyngeal exudate.   Eyes: Conjunctivae are normal. Right eye exhibits no discharge. Left eye exhibits no discharge. No scleral icterus.   Neck: Neck supple. No JVD present. Carotid bruit is not present. No thyromegaly present.   Cardiovascular: Normal rate, regular rhythm, S1 normal and S2 normal. Exam reveals no gallop, no S3 and no S4.   Murmur heard.   Harsh midsystolic " murmur is present with a grade of 2/6 at the upper right sternal border radiating to the neck.  High-pitched blowing decrescendo early diastolic murmur is present with a grade of 3/6 at the upper right sternal border radiating to the apex.  Pulses:       Radial pulses are 2+ on the right side, and 2+ on the left side.        Dorsalis pedis pulses are 2+ on the right side, and 2+ on the left side.        Posterior tibial pulses are 2+ on the right side, and 2+ on the left side.   Pulmonary/Chest: Effort normal and breath sounds normal.   Abdominal: Soft. Normal appearance.   Musculoskeletal:        Right ankle: He exhibits no swelling, no ecchymosis and no deformity.        Left ankle: He exhibits no swelling, no ecchymosis and no deformity.   Lymphadenopathy:        Head (right side): No submandibular adenopathy present.        Head (left side): No submandibular adenopathy present.     He has no cervical adenopathy.   Neurological: He is alert and oriented to person, place, and time.   Skin: Skin is warm, dry and intact. Rash noted. He is not diaphoretic.   Psychiatric: He has a normal mood and affect. His speech is normal and behavior is normal. Judgment and thought content normal. Cognition and memory are normal.       Assessment:     1. Nonrheumatic aortic valve insufficiency    2. Shortness of breath    3. Essential hypertension    4. Hypercholesterolemia    5. Type 2 diabetes mellitus without complication, without long-term current use of insulin    6. Mild obesity    7. History of gout        Plan:     1. Aortic Valve Disorder   9/9/2015: Echo: Normal left ventricular size and systolic function. Moderate LVH. Mild diastolic dysfunction. Mildly dilated LA. Mild aortic valve sclerosis. Mild to moderate AR.   Control risk factors and especially blood pressure.   4/23/2019: .   1/2019: Began experience orthopnea.   Do Echocardiogram. Do CXR.    2. Hypertension   2010: Diagnosed.   Used to take amlodipine 10  mg Q24.   On lisinopril 20 mg Q24 and hctz 12.5 mg Q24.   Keeping log at home.   Running slightly high especially for AR.   6/26/2019: Change hctz to furosemide 20 mg Q24.   7/15/2019: BMP and BNP.   Then may add amlodipine.    3. Hypercholesterolemia   2013: Began statin.   On pravastatin 40 mg Q24.   4/23/2019: Chol 137. HDL 42. LDL 69. .   On pravastatin 40 mg Q24.   Well controlled.    4. Diabetes Mellitus, Type 2   3/4/2016: Diagnosed. Fasting Glucose 139. Complications: None. Medications: Diet and lost weight.     Excellent after lost weight.    5. Mild Obesity   6/26/2019: Weight 109 kg. BMI 31.   Encouraged to lose weight.    6. History of Gout   1995: Diagnosed. Feet, elbow and knee.   On allopurinol 300 mg Q24.   No recurrence.    7. Back Pains   ESIs.    8. Primary Care   Dr. Murtaza Castillo.    F/u 2 months.    Tere Tyler M.D.      7/4/2019 3:19 PM, Addendum:    7/2/2019: Echo: Moderately dilated left ventricle with normal systolic function. EF 55-60%. Mild LVH. Mild diastolic dysfunction. Moderate aortic valve sclerosis - 2.2 m/s. Moderate AR.    7/2/2019: CXR: Clear.    7/3/2019: .    Suspect dyspnea due to mild HF. On furosemide. He is breathing easier.    I discussed the above test result and the implications of the findings over the phone.    Tere Tyler M.D.      Copy:    Dr. Murtaza Castillo.

## 2019-06-26 NOTE — LETTER
June 26, 2019      Murtaza Castillo MD  [address] [place] [address]           [facility]  [address] [place] [address], [address]  [facility] [address]  Phone: [phone]  Fax: [phone]          Patient: Dominguez Hutton   MR Number: [id number]   YOB: 1955   Date of Visit: 6/26/2019       Dear Dr. Murtaza Castillo:    Thank you for referring Dominguez Hutton to me for evaluation. Attached you will find relevant portions of my assessment and plan of care.    If you have questions, please do not hesitate to call me. I look forward to following Dominguez Hutton along with you.    Sincerely,    Tere Tyler MD    Enclosure  CC:  No Recipients    If you would like to receive this communication electronically, please contact [email or web address] or [phone] to request more information on [employer] Link access.    For providers and/or their staff who would like to refer a patient to [facility], please contact us through our one-stop-shop provider referral line, [facility], at [phone].    If you feel you have received this communication in error or would no longer like to receive these types of communications, please e-mail [email or web address]

## 2019-07-02 ENCOUNTER — HOSPITAL ENCOUNTER (OUTPATIENT)
Dept: RADIOLOGY | Facility: OTHER | Age: 64
Discharge: HOME | End: 2019-07-02
Attending: INTERNAL MEDICINE
Payer: COMMERCIAL

## 2019-07-02 ENCOUNTER — HOSPITAL ENCOUNTER (OUTPATIENT)
Dept: CARDIOLOGY | Facility: OTHER | Age: 64
Discharge: HOME | End: 2019-07-02
Attending: INTERNAL MEDICINE
Payer: COMMERCIAL

## 2019-07-02 ENCOUNTER — CLINICAL SUPPORT (OUTPATIENT)
Dept: CARDIOLOGY | Facility: CLINIC | Age: 64
End: 2019-07-02
Attending: INTERNAL MEDICINE
Payer: COMMERCIAL

## 2019-07-02 DIAGNOSIS — I35.1 NONRHEUMATIC AORTIC VALVE INSUFFICIENCY: ICD-10-CM

## 2019-07-02 DIAGNOSIS — R06.02 SHORTNESS OF BREATH: ICD-10-CM

## 2019-07-02 PROCEDURE — 71046 X-RAY EXAM CHEST 2 VIEWS: CPT | Mod: TC,FY

## 2019-07-02 PROCEDURE — 71046 X-RAY EXAM CHEST 2 VIEWS: CPT | Mod: 26,,, | Performed by: RADIOLOGY

## 2019-07-02 PROCEDURE — 93010 EKG 12-LEAD: ICD-10-PCS | Mod: S$GLB,,, | Performed by: INTERNAL MEDICINE

## 2019-07-02 PROCEDURE — 93306 PR ECHO HEART XTHORACIC,COMPLETE W DOPPLER: ICD-10-PCS | Mod: S$GLB,,, | Performed by: INTERNAL MEDICINE

## 2019-07-02 PROCEDURE — 93010 ELECTROCARDIOGRAM REPORT: CPT | Mod: S$GLB,,, | Performed by: INTERNAL MEDICINE

## 2019-07-02 PROCEDURE — 93005 ELECTROCARDIOGRAM TRACING: CPT

## 2019-07-02 PROCEDURE — 71046 XR CHEST PA AND LATERAL: ICD-10-PCS | Mod: 26,,, | Performed by: RADIOLOGY

## 2019-07-02 PROCEDURE — 93306 TTE W/DOPPLER COMPLETE: CPT | Mod: S$GLB,,, | Performed by: INTERNAL MEDICINE

## 2019-07-03 LAB
BNP SERPL-MCNC: 159 PG/ML
BUN SERPL-MCNC: 15 MG/DL (ref 7–25)
BUN/CREAT SERPL: NORMAL (CALC) (ref 6–22)
CALCIUM SERPL-MCNC: 10.1 MG/DL (ref 8.6–10.3)
CHLORIDE SERPL-SCNC: 105 MMOL/L (ref 98–110)
CO2 SERPL-SCNC: 29 MMOL/L (ref 20–32)
CREAT SERPL-MCNC: 0.98 MG/DL (ref 0.7–1.25)
GFRSERPLBLD MDRD-ARVRAT: 81 ML/MIN/1.73M2
GLUCOSE SERPL-MCNC: 85 MG/DL (ref 65–99)
POTASSIUM SERPL-SCNC: 4.4 MMOL/L (ref 3.5–5.3)
SODIUM SERPL-SCNC: 139 MMOL/L (ref 135–146)

## 2019-08-13 ENCOUNTER — OFFICE VISIT (OUTPATIENT)
Dept: CARDIOLOGY | Facility: CLINIC | Age: 64
End: 2019-08-13
Attending: INTERNAL MEDICINE
Payer: COMMERCIAL

## 2019-08-13 VITALS
HEART RATE: 63 BPM | WEIGHT: 238 LBS | BODY MASS INDEX: 30.54 KG/M2 | DIASTOLIC BLOOD PRESSURE: 58 MMHG | SYSTOLIC BLOOD PRESSURE: 123 MMHG | HEIGHT: 74 IN

## 2019-08-13 DIAGNOSIS — E78.00 HYPERCHOLESTEROLEMIA: ICD-10-CM

## 2019-08-13 DIAGNOSIS — E66.9 MILD OBESITY: ICD-10-CM

## 2019-08-13 DIAGNOSIS — R06.02 SHORTNESS OF BREATH: ICD-10-CM

## 2019-08-13 DIAGNOSIS — I35.1 NONRHEUMATIC AORTIC VALVE INSUFFICIENCY: ICD-10-CM

## 2019-08-13 DIAGNOSIS — Z87.39 HISTORY OF GOUT: ICD-10-CM

## 2019-08-13 DIAGNOSIS — E11.9 TYPE 2 DIABETES MELLITUS WITHOUT COMPLICATION, WITHOUT LONG-TERM CURRENT USE OF INSULIN: ICD-10-CM

## 2019-08-13 DIAGNOSIS — I10 ESSENTIAL HYPERTENSION: ICD-10-CM

## 2019-08-13 PROCEDURE — 99214 PR OFFICE/OUTPT VISIT, EST, LEVL IV, 30-39 MIN: ICD-10-PCS | Mod: S$GLB,,, | Performed by: INTERNAL MEDICINE

## 2019-08-13 PROCEDURE — 3078F PR MOST RECENT DIASTOLIC BLOOD PRESSURE < 80 MM HG: ICD-10-PCS | Mod: CPTII,S$GLB,, | Performed by: INTERNAL MEDICINE

## 2019-08-13 PROCEDURE — 3074F SYST BP LT 130 MM HG: CPT | Mod: CPTII,S$GLB,, | Performed by: INTERNAL MEDICINE

## 2019-08-13 PROCEDURE — 99214 OFFICE O/P EST MOD 30 MIN: CPT | Mod: S$GLB,,, | Performed by: INTERNAL MEDICINE

## 2019-08-13 PROCEDURE — 3078F DIAST BP <80 MM HG: CPT | Mod: CPTII,S$GLB,, | Performed by: INTERNAL MEDICINE

## 2019-08-13 PROCEDURE — 3008F PR BODY MASS INDEX (BMI) DOCUMENTED: ICD-10-PCS | Mod: CPTII,S$GLB,, | Performed by: INTERNAL MEDICINE

## 2019-08-13 PROCEDURE — 3074F PR MOST RECENT SYSTOLIC BLOOD PRESSURE < 130 MM HG: ICD-10-PCS | Mod: CPTII,S$GLB,, | Performed by: INTERNAL MEDICINE

## 2019-08-13 PROCEDURE — 3008F BODY MASS INDEX DOCD: CPT | Mod: CPTII,S$GLB,, | Performed by: INTERNAL MEDICINE

## 2019-08-13 NOTE — PROGRESS NOTES
Subjective:     Dominguez Hutton is a 63 y.o. male with hypertension, hypercholesterolemia and diet controlled diabetes mellitus type 2. He has a lot of back pains and has received several DANDRE over the last several years. Because of a murmur he underwent an Echocardiogram on 9/9/2015 that revealed some aortic valve disease with mild to moderate aortic regurgitations. In 1/2019 he began experience orthopnea at [facility] that resolved as he was sitting up. On 7/2/2019 he had an Echo that revealed a moderately dilated left ventricle with normal systolic function with an EF 55-60%. There was mild LVH with mild diastolic dysfunction. There was moderate aortic valve sclerosis with apeak velocity of 2.2 m/s and moderate AR. A BNP was 495 but came down to 159 as he began furosemide 20 mg Q24. No exertional dyspnea or edema of his legs. He denies any exertional chest pain. No palpitations or weak spells. Feeling well except for his back pains.       Shortness of Breath   This is a new problem. The current episode started more than 1 month ago. The problem occurs every several days. The problem has been gradually worsening. Pertinent negatives include no abdominal pain, chest pain, claudication, coryza, ear pain, fever, headaches, hemoptysis, leg pain, leg swelling, neck pain, orthopnea, PND, rash, rhinorrhea, sputum production, swollen glands, syncope, vomiting or wheezing.   Hypertension   This is a chronic problem. The current episode started more than 1 year ago. The problem is unchanged. The problem is controlled (usually 120-130/60-70 mmHg at home). Pertinent negatives include no anxiety, blurred vision, chest pain, headaches, malaise/fatigue, neck pain, orthopnea, palpitations, peripheral edema, PND, shortness of breath or sweats. There is no history of chronic renal disease.   Hyperlipidemia   This is a chronic problem. The current episode started more than 1 year ago. The problem is controlled. Recent lipid tests  were reviewed and are normal. Exacerbating diseases include obesity. He has no history of chronic renal disease, diabetes, hypothyroidism, liver disease or nephrotic syndrome. Pertinent negatives include no chest pain, focal sensory loss, focal weakness, leg pain, myalgias or shortness of breath.       Review of Systems   Constitution: Negative for chills, fever, malaise/fatigue, night sweats, weight gain and weight loss.   HENT: Negative for ear pain, nosebleeds and rhinorrhea.    Eyes: Negative for blurred vision, vision loss in left eye and vision loss in right eye.   Cardiovascular: Negative for chest pain, claudication, dyspnea on exertion, irregular heartbeat, leg swelling, near-syncope, orthopnea, palpitations, paroxysmal nocturnal dyspnea and syncope.   Respiratory: Negative for cough, hemoptysis, shortness of breath, sleep disturbances due to breathing, sputum production and wheezing.    Endocrine: Negative for cold intolerance and heat intolerance.   Hematologic/Lymphatic: Negative for bleeding problem. Does not bruise/bleed easily.   Skin: Negative for color change and rash.   Musculoskeletal: Positive for back pain and gout. Negative for arthritis, falls, joint swelling, muscle cramps, myalgias and neck pain.   Gastrointestinal: Negative for abdominal pain, heartburn, hematemesis, hematochezia, hemorrhoids, jaundice, melena, nausea and vomiting.   Genitourinary: Negative for dysuria and hematuria.   Neurological: Negative for dizziness, focal weakness, headaches, light-headedness, loss of balance, numbness, paresthesias and vertigo.   Psychiatric/Behavioral: Negative for altered mental status, depression and substance abuse. The patient is not nervous/anxious.    Allergic/Immunologic: Negative for hives and persistent infections.       Current Outpatient Medications on File Prior to Visit   Medication Sig Dispense Refill    alendronate-vitamin D3 (FOSAMAX PLUS D) 70-2,800 mg-unit per tablet Take 1  "tablet by mouth every 7 days.      allopurinol (ZYLOPRIM) 300 MG tablet Take 300 mg by mouth once daily.       amlodipine (NORVASC) 10 MG tablet TK 1 T PO QAM  0    aspirin (ECOTRIN) 81 MG EC tablet Take 81 mg by mouth once daily.      b complex vitamins tablet Take 1 tablet by mouth once daily.      docusate sodium (COLACE) 100 MG capsule Take 1 capsule (100 mg total) by mouth 2 (two) times daily. 60 capsule 0    ergocalciferol (ERGOCALCIFEROL) 50,000 unit Cap Take 50,000 Units by mouth every 7 days.      fish oil-omega-3 fatty acids 300-1,000 mg capsule Take 2 g by mouth once daily.      furosemide (LASIX) 20 MG tablet Take 1 tablet (20 mg total) by mouth once daily. 90 tablet 3    gabapentin (NEURONTIN) 300 MG capsule Take one in the morning, one in the afternoon and two at bedtime for 5 days.  Then, two in the morning, one in the afternoon and two at bedtime for 5 days.  Then, two in the morning, two in the afternoon and two at bedtime if tolerated. 180 capsule 1    hydrochlorothiazide (HYDRODIURIL) 25 MG tablet TK 1 T PO QD  4    hydrocodone-acetaminophen 10-325mg (NORCO)  mg Tab Take 1 tablet by mouth 3 (three) times daily as needed.   0    lisinopril (PRINIVIL,ZESTRIL) 20 MG tablet Take 20 mg by mouth once daily.   0    multivitamin (THERAGRAN) per tablet Take 1 tablet by mouth once daily.      pravastatin (PRAVACHOL) 40 MG tablet Take 40 mg by mouth every evening.       tamsulosin (FLOMAX) 0.4 mg Cp24 Take 1 capsule (0.4 mg total) by mouth once daily. 30 capsule 11     No current facility-administered medications on file prior to visit.        BP (!) 123/58   Pulse 63   Ht 6' 2" (1.88 m)   Wt 108 kg (238 lb)   BMI 30.56 kg/m²       Objective:     Physical Exam   Constitutional: He is oriented to person, place, and time. He appears well-developed and well-nourished. No distress.   HENT:   Head: Normocephalic and atraumatic.   Mouth/Throat: No oropharyngeal exudate.   Eyes: " Conjunctivae are normal. Right eye exhibits no discharge. Left eye exhibits no discharge. No scleral icterus.   Neck: Neck supple. No JVD present. Carotid bruit is not present. No thyromegaly present.   Cardiovascular: Normal rate, regular rhythm, S1 normal and S2 normal. Exam reveals no gallop, no S3 and no S4.   Murmur heard.   Harsh midsystolic murmur is present with a grade of 2/6 at the upper right sternal border radiating to the neck.  High-pitched blowing decrescendo early diastolic murmur is present with a grade of 3/6 at the upper right sternal border radiating to the apex.  Pulses:       Radial pulses are 2+ on the right side, and 2+ on the left side.        Dorsalis pedis pulses are 2+ on the right side, and 2+ on the left side.        Posterior tibial pulses are 2+ on the right side, and 2+ on the left side.   Pulmonary/Chest: Effort normal and breath sounds normal.   Abdominal: Soft. Normal appearance.   Musculoskeletal:        Right ankle: He exhibits no swelling, no ecchymosis and no deformity.        Left ankle: He exhibits no swelling, no ecchymosis and no deformity.   Lymphadenopathy:        Head (right side): No submandibular adenopathy present.        Head (left side): No submandibular adenopathy present.     He has no cervical adenopathy.   Neurological: He is alert and oriented to person, place, and time.   Skin: Skin is warm, dry and intact. Rash noted. He is not diaphoretic.   Psychiatric: He has a normal mood and affect. His speech is normal and behavior is normal. Judgment and thought content normal. Cognition and memory are normal.       Assessment:     1. Nonrheumatic aortic valve insufficiency    2. Shortness of breath    3. Essential hypertension    4. Hypercholesterolemia    5. Type 2 diabetes mellitus without complication, without long-term current use of insulin    6. Mild obesity    7. History of gout        Plan:     1. Aortic Valve Disorder   9/9/2015: Echo: Normal left  ventricular size and systolic function. Moderate LVH. Mild diastolic dysfunction. Mildly dilated LA. Mild aortic valve sclerosis. Mild to moderate AR.   7/2/2019: Echo: Moderately dilated left ventricle with normal systolic function. EF 55-60%. Mild LVH. Mild diastolic dysfunction. Moderate aortic valve sclerosis - 2.2 m/s. Moderate AR.   1/2019: Began experience orthopnea.   4/23/2019: .   7/2/2019: CXR: Clear.    7/3/2019: .   On furosemide 20 mg Q24 and furosemide 20 mg Q24.   Control risk factors and especially blood pressure.   Doing well.    2. Hypertension   2010: Diagnosed.   Used to take amlodipine 10 mg Q24.   Used to be on hctz 12.5 mg Q24.   On lisinopril 20 mg Q24 and furosemide 20 mg Q24.   Keeping log at home.   Well controlled.     3. Hypercholesterolemia   2013: Began statin.   On pravastatin 40 mg Q24.   4/23/2019: Chol 137. HDL 42. LDL 69. .   On pravastatin 40 mg Q24.   Well controlled.    4. Diabetes Mellitus, Type 2   3/4/2016: Diagnosed. Fasting Glucose 139. Complications: None. Medications: Diet and lost weight.     Excellent after lost weight.    5. Mild Obesity   6/26/2019: Weight 109 kg. BMI 31.   Encouraged to lose weight.    6. History of Gout   1995: Diagnosed. Feet, elbow and knee.   On allopurinol 300 mg Q24.   No recurrence.    7. Back Pains   ESIs.    8. Primary Care   Dr. Murtaza Castillo.    F/u 6 months.    Tere Tyler M.D.

## 2020-02-18 ENCOUNTER — OFFICE VISIT (OUTPATIENT)
Dept: CARDIOLOGY | Facility: CLINIC | Age: 65
End: 2020-02-18
Attending: INTERNAL MEDICINE
Payer: COMMERCIAL

## 2020-02-18 VITALS
HEART RATE: 68 BPM | DIASTOLIC BLOOD PRESSURE: 64 MMHG | HEIGHT: 74 IN | WEIGHT: 220 LBS | SYSTOLIC BLOOD PRESSURE: 147 MMHG | BODY MASS INDEX: 28.23 KG/M2

## 2020-02-18 DIAGNOSIS — E78.00 HYPERCHOLESTEROLEMIA: ICD-10-CM

## 2020-02-18 DIAGNOSIS — R06.02 SHORTNESS OF BREATH: ICD-10-CM

## 2020-02-18 DIAGNOSIS — Z87.39 HISTORY OF GOUT: ICD-10-CM

## 2020-02-18 DIAGNOSIS — I35.1 NONRHEUMATIC AORTIC VALVE INSUFFICIENCY: ICD-10-CM

## 2020-02-18 DIAGNOSIS — E66.3 OVERWEIGHT: ICD-10-CM

## 2020-02-18 DIAGNOSIS — I10 ESSENTIAL HYPERTENSION: ICD-10-CM

## 2020-02-18 DIAGNOSIS — E11.9 TYPE 2 DIABETES MELLITUS WITHOUT COMPLICATION, WITHOUT LONG-TERM CURRENT USE OF INSULIN: ICD-10-CM

## 2020-02-18 PROCEDURE — 3008F PR BODY MASS INDEX (BMI) DOCUMENTED: ICD-10-PCS | Mod: CPTII,S$GLB,, | Performed by: INTERNAL MEDICINE

## 2020-02-18 PROCEDURE — 3078F DIAST BP <80 MM HG: CPT | Mod: CPTII,S$GLB,, | Performed by: INTERNAL MEDICINE

## 2020-02-18 PROCEDURE — 3008F BODY MASS INDEX DOCD: CPT | Mod: CPTII,S$GLB,, | Performed by: INTERNAL MEDICINE

## 2020-02-18 PROCEDURE — 99214 PR OFFICE/OUTPT VISIT, EST, LEVL IV, 30-39 MIN: ICD-10-PCS | Mod: S$GLB,,, | Performed by: INTERNAL MEDICINE

## 2020-02-18 PROCEDURE — 3078F PR MOST RECENT DIASTOLIC BLOOD PRESSURE < 80 MM HG: ICD-10-PCS | Mod: CPTII,S$GLB,, | Performed by: INTERNAL MEDICINE

## 2020-02-18 PROCEDURE — 99214 OFFICE O/P EST MOD 30 MIN: CPT | Mod: S$GLB,,, | Performed by: INTERNAL MEDICINE

## 2020-02-18 PROCEDURE — 3077F SYST BP >= 140 MM HG: CPT | Mod: CPTII,S$GLB,, | Performed by: INTERNAL MEDICINE

## 2020-02-18 PROCEDURE — 3077F PR MOST RECENT SYSTOLIC BLOOD PRESSURE >= 140 MM HG: ICD-10-PCS | Mod: CPTII,S$GLB,, | Performed by: INTERNAL MEDICINE

## 2020-02-18 RX ORDER — AMLODIPINE BESYLATE 5 MG/1
5 TABLET ORAL DAILY
Qty: 90 TABLET | Refills: 3 | Status: ON HOLD | OUTPATIENT
Start: 2020-02-18 | End: 2020-02-21

## 2020-02-18 RX ORDER — LISINOPRIL 40 MG/1
40 TABLET ORAL DAILY
Qty: 90 TABLET | Refills: 3 | Status: SHIPPED | OUTPATIENT
Start: 2020-02-18 | End: 2020-03-16 | Stop reason: SDUPTHER

## 2020-02-18 RX ORDER — FUROSEMIDE 20 MG/1
20 TABLET ORAL DAILY
Qty: 90 TABLET | Refills: 3 | Status: SHIPPED | OUTPATIENT
Start: 2020-02-18 | End: 2020-03-16 | Stop reason: SDUPTHER

## 2020-02-18 NOTE — PROGRESS NOTES
Subjective:     Dominguez Hutton is a 64 y.o. male with hypertension, hypercholesterolemia and diet controlled diabetes mellitus type 2. He has a lot of back pains and has received several DANDRE over the last several years. Because of a murmur he underwent an Echocardiogram on 9/9/2015 that revealed some aortic valve disease with mild to moderate aortic regurgitations. In 1/2019 he began experience orthopnea at [facility] that resolved as he was sitting up. On 7/2/2019 he had an Echo that revealed a moderately dilated left ventricle with normal systolic function with an EF 55-60%. There was mild LVH with mild diastolic dysfunction. There was moderate aortic valve sclerosis with apeak velocity of 2.2 m/s and moderate AR. A BNP was 495 but came down to 159 as he began furosemide 20 mg Q24. No exertional dyspnea or edema of his legs. He denies any exertional chest pain. No palpitations or weak spells. Feeling well except for his back pains.       Hypertension   This is a chronic problem. The current episode started more than 1 year ago. The problem is unchanged. The problem is uncontrolled (usually 140-160/60-70 mmHg at home). Pertinent negatives include no anxiety, blurred vision, chest pain, headaches, malaise/fatigue, neck pain, orthopnea, palpitations, peripheral edema, PND, shortness of breath or sweats. There is no history of chronic renal disease.   Hyperlipidemia   This is a chronic problem. The current episode started more than 1 year ago. The problem is controlled. Recent lipid tests were reviewed and are normal. He has no history of chronic renal disease, diabetes, hypothyroidism, liver disease, obesity or nephrotic syndrome. Pertinent negatives include no chest pain, focal sensory loss, focal weakness, leg pain, myalgias or shortness of breath.   Shortness of Breath   This is a new problem. The current episode started more than 1 month ago. The problem occurs daily. The problem has been resolved. Pertinent  negatives include no abdominal pain, chest pain, claudication, coryza, ear pain, fever, headaches, hemoptysis, leg pain, leg swelling, neck pain, orthopnea, PND, rash, rhinorrhea, sore throat, sputum production, swollen glands, syncope, vomiting or wheezing.       Review of Systems   Constitution: Negative for chills, fever, malaise/fatigue, night sweats, weight gain and weight loss.   HENT: Negative for ear pain, nosebleeds, rhinorrhea and sore throat.    Eyes: Negative for blurred vision, vision loss in left eye and vision loss in right eye.   Cardiovascular: Negative for chest pain, claudication, dyspnea on exertion, irregular heartbeat, leg swelling, near-syncope, orthopnea, palpitations, paroxysmal nocturnal dyspnea and syncope.   Respiratory: Negative for cough, hemoptysis, shortness of breath, sleep disturbances due to breathing, sputum production and wheezing.    Endocrine: Negative for cold intolerance and heat intolerance.   Hematologic/Lymphatic: Negative for bleeding problem. Does not bruise/bleed easily.   Skin: Negative for color change and rash.   Musculoskeletal: Positive for back pain and gout. Negative for arthritis, falls, joint swelling, muscle cramps, myalgias and neck pain.   Gastrointestinal: Negative for abdominal pain, heartburn, hematemesis, hematochezia, hemorrhoids, jaundice, melena, nausea and vomiting.   Genitourinary: Negative for dysuria and hematuria.   Neurological: Negative for dizziness, focal weakness, headaches, light-headedness, loss of balance, numbness, paresthesias and vertigo.   Psychiatric/Behavioral: Negative for altered mental status, depression and substance abuse. The patient is not nervous/anxious.    Allergic/Immunologic: Negative for hives and persistent infections.       Current Outpatient Medications on File Prior to Visit   Medication Sig Dispense Refill    alendronate-vitamin D3 (FOSAMAX PLUS D) 70-2,800 mg-unit per tablet Take 1 tablet by mouth every 7 days.    "   allopurinol (ZYLOPRIM) 300 MG tablet Take 300 mg by mouth once daily.       amlodipine (NORVASC) 10 MG tablet TK 1 T PO QAM  0    aspirin (ECOTRIN) 81 MG EC tablet Take 81 mg by mouth once daily.      b complex vitamins tablet Take 1 tablet by mouth once daily.      docusate sodium (COLACE) 100 MG capsule Take 1 capsule (100 mg total) by mouth 2 (two) times daily. 60 capsule 0    ergocalciferol (ERGOCALCIFEROL) 50,000 unit Cap Take 50,000 Units by mouth every 7 days.      fish oil-omega-3 fatty acids 300-1,000 mg capsule Take 2 g by mouth once daily.      furosemide (LASIX) 20 MG tablet Take 1 tablet (20 mg total) by mouth once daily. 90 tablet 3    gabapentin (NEURONTIN) 300 MG capsule Take one in the morning, one in the afternoon and two at bedtime for 5 days.  Then, two in the morning, one in the afternoon and two at bedtime for 5 days.  Then, two in the morning, two in the afternoon and two at bedtime if tolerated. 180 capsule 1    hydrocodone-acetaminophen 10-325mg (NORCO)  mg Tab Take 1 tablet by mouth 3 (three) times daily as needed.   0    lisinopril (PRINIVIL,ZESTRIL) 20 MG tablet Take 20 mg by mouth once daily.   0    multivitamin (THERAGRAN) per tablet Take 1 tablet by mouth once daily.      pravastatin (PRAVACHOL) 40 MG tablet Take 40 mg by mouth every evening.       tamsulosin (FLOMAX) 0.4 mg Cp24 Take 1 capsule (0.4 mg total) by mouth once daily. 30 capsule 11     No current facility-administered medications on file prior to visit.        BP (!) 147/64   Pulse 68   Ht 6' 2" (1.88 m)   Wt 99.8 kg (220 lb)   BMI 28.25 kg/m²       Objective:     Physical Exam   Constitutional: He is oriented to person, place, and time. He appears well-developed and well-nourished. No distress.   HENT:   Head: Normocephalic and atraumatic.   Mouth/Throat: No oropharyngeal exudate.   Eyes: Conjunctivae are normal. Right eye exhibits no discharge. Left eye exhibits no discharge. No scleral icterus. "   Neck: Neck supple. No JVD present. Carotid bruit is not present. No thyromegaly present.   Cardiovascular: Normal rate, regular rhythm, S1 normal and S2 normal. Exam reveals no gallop, no S3 and no S4.   Murmur heard.   Harsh midsystolic murmur is present with a grade of 2/6 at the upper right sternal border radiating to the neck.  High-pitched blowing decrescendo early diastolic murmur is present with a grade of 3/6 at the upper right sternal border radiating to the apex.  Pulses:       Radial pulses are 2+ on the right side, and 2+ on the left side.        Dorsalis pedis pulses are 2+ on the right side, and 2+ on the left side.        Posterior tibial pulses are 2+ on the right side, and 2+ on the left side.   Pulmonary/Chest: Effort normal and breath sounds normal.   Abdominal: Soft. Normal appearance.   Musculoskeletal:        Right ankle: He exhibits no swelling, no ecchymosis and no deformity.        Left ankle: He exhibits no swelling, no ecchymosis and no deformity.   Lymphadenopathy:        Head (right side): No submandibular adenopathy present.        Head (left side): No submandibular adenopathy present.     He has no cervical adenopathy.   Neurological: He is alert and oriented to person, place, and time.   Skin: Skin is warm, dry and intact. Rash noted. He is not diaphoretic.   Psychiatric: He has a normal mood and affect. His speech is normal and behavior is normal. Judgment and thought content normal. Cognition and memory are normal.       Assessment:     1. Nonrheumatic aortic valve insufficiency    2. Shortness of breath    3. Essential hypertension    4. Hypercholesterolemia    5. Type 2 diabetes mellitus without complication, without long-term current use of insulin    6. Mild obesity    7. History of gout        Plan:     1. Aortic Valve Disease   9/9/2015: Echo: Normal left ventricular size and systolic function. Moderate LVH. Mild diastolic dysfunction. Mildly dilated LA. Mild aortic valve  sclerosis. Mild to moderate AR.   7/2/2019: Echo: Moderately dilated left ventricle with normal systolic function. EF 55-60%. Mild LVH. Mild diastolic dysfunction. Moderate aortic valve sclerosis - 2.2 m/s. Moderate AR.   1/2019: Began experience orthopnea.   4/23/2019: .   7/2/2019: CXR: Clear.    7/3/2019: .   On lisinopril 20 mg Q24 and furosemide 20 mg Q24.   Control risk factors - especially blood pressure.   Doing well.    2. Hypertension   2010: Diagnosed.   Used to take amlodipine 10 mg Q24.   Used to be on hctz 12.5 mg Q24.   On lisinopril 20 mg Q24 and furosemide 20 mg Q24.   Keeping log at home.   Running high.   2/18/2020: Increase lisinopril to 40 mg Q24 and add amlodipine 5 mg Q24.     3. Hypercholesterolemia   2013: Began statin.   On pravastatin 40 mg Q24.   4/23/2019: Chol 137. HDL 42. LDL 69. .   On pravastatin 40 mg Q24.   Well controlled.    4. Diabetes Mellitus, Type 2   3/4/2016: Diagnosed. Fasting Glucose 139. Complications: None. Medications: Diet and lost weight.     Excellent after lost weight.    5. Mild Obesity   6/26/2019: Weight 109 kg. BMI 31.   Encouraged to lose weight.    6. History of Gout   1995: Diagnosed. Feet, elbow and knee.   On allopurinol 300 mg Q24.   No recurrence.    7. Back Pains   ESIs.    8. Primary Care   In [place].    F/u 2 months.    Tere Tyler M.D.

## 2020-02-20 ENCOUNTER — HOSPITAL ENCOUNTER (OUTPATIENT)
Facility: HOSPITAL | Age: 65
Discharge: HOME | End: 2020-02-21
Attending: EMERGENCY MEDICINE | Admitting: HOSPITALIST
Payer: COMMERCIAL

## 2020-02-20 DIAGNOSIS — R07.9 CHEST PAIN: ICD-10-CM

## 2020-02-20 DIAGNOSIS — R55 SYNCOPE: Primary | ICD-10-CM

## 2020-02-20 DIAGNOSIS — S09.90XA HEAD TRAUMA: ICD-10-CM

## 2020-02-20 DIAGNOSIS — R79.89 ELEVATED TROPONIN: ICD-10-CM

## 2020-02-20 LAB
ALBUMIN SERPL BCP-MCNC: 3.8 G/DL (ref 3.5–5.2)
ALP SERPL-CCNC: 66 U/L (ref 55–135)
ALT SERPL W/O P-5'-P-CCNC: 18 U/L (ref 10–44)
ANION GAP SERPL CALC-SCNC: 10 MMOL/L (ref 8–16)
ANISOCYTOSIS BLD QL SMEAR: SLIGHT
AST SERPL-CCNC: 25 U/L (ref 10–40)
BASOPHILS # BLD AUTO: 0.06 K/UL (ref 0–0.2)
BASOPHILS NFR BLD: 0.6 % (ref 0–1.9)
BILIRUB SERPL-MCNC: 0.3 MG/DL (ref 0.1–1)
BUN SERPL-MCNC: 17 MG/DL (ref 8–23)
CALCIUM SERPL-MCNC: 10.2 MG/DL (ref 8.7–10.5)
CHLORIDE SERPL-SCNC: 106 MMOL/L (ref 95–110)
CO2 SERPL-SCNC: 25 MMOL/L (ref 23–29)
CREAT SERPL-MCNC: 0.9 MG/DL (ref 0.5–1.4)
DIFFERENTIAL METHOD: ABNORMAL
EOSINOPHIL # BLD AUTO: 0.1 K/UL (ref 0–0.5)
EOSINOPHIL NFR BLD: 1 % (ref 0–8)
ERYTHROCYTE [DISTWIDTH] IN BLOOD BY AUTOMATED COUNT: 12.6 % (ref 11.5–14.5)
EST. GFR  (AFRICAN AMERICAN): >60 ML/MIN/1.73 M^2
EST. GFR  (NON AFRICAN AMERICAN): >60 ML/MIN/1.73 M^2
GLUCOSE SERPL-MCNC: 116 MG/DL (ref 70–110)
HCT VFR BLD AUTO: 43.5 % (ref 40–54)
HGB BLD-MCNC: 14.4 G/DL (ref 14–18)
IMM GRANULOCYTES # BLD AUTO: 0.02 K/UL (ref 0–0.04)
IMM GRANULOCYTES NFR BLD AUTO: 0.2 % (ref 0–0.5)
LYMPHOCYTES # BLD AUTO: 1.3 K/UL (ref 1–4.8)
LYMPHOCYTES NFR BLD: 13.1 % (ref 18–48)
MCH RBC QN AUTO: 30 PG (ref 27–31)
MCHC RBC AUTO-ENTMCNC: 33.1 G/DL (ref 32–36)
MCV RBC AUTO: 91 FL (ref 82–98)
MONOCYTES # BLD AUTO: 0.7 K/UL (ref 0.3–1)
MONOCYTES NFR BLD: 7.3 % (ref 4–15)
NEUTROPHILS # BLD AUTO: 7.6 K/UL (ref 1.8–7.7)
NEUTROPHILS NFR BLD: 77.8 % (ref 38–73)
NRBC BLD-RTO: 0 /100 WBC
PLATELET # BLD AUTO: 185 K/UL (ref 150–350)
PLATELET BLD QL SMEAR: ABNORMAL
PMV BLD AUTO: 10 FL (ref 9.2–12.9)
POTASSIUM SERPL-SCNC: 4.4 MMOL/L (ref 3.5–5.1)
PROT SERPL-MCNC: 6.9 G/DL (ref 6–8.4)
RBC # BLD AUTO: 4.8 M/UL (ref 4.6–6.2)
SODIUM SERPL-SCNC: 141 MMOL/L (ref 136–145)
TROPONIN I SERPL DL<=0.01 NG/ML-MCNC: 0.08 NG/ML (ref 0–0.03)
WBC # BLD AUTO: 9.76 K/UL (ref 3.9–12.7)

## 2020-02-20 PROCEDURE — G0378 HOSPITAL OBSERVATION PER HR: HCPCS

## 2020-02-20 PROCEDURE — 25000003 PHARM REV CODE 250: Performed by: NURSE PRACTITIONER

## 2020-02-20 PROCEDURE — 99285 EMERGENCY DEPT VISIT HI MDM: CPT | Mod: 25

## 2020-02-20 PROCEDURE — 84484 ASSAY OF TROPONIN QUANT: CPT

## 2020-02-20 PROCEDURE — 85025 COMPLETE CBC W/AUTO DIFF WBC: CPT

## 2020-02-20 PROCEDURE — 83036 HEMOGLOBIN GLYCOSYLATED A1C: CPT

## 2020-02-20 PROCEDURE — 25000003 PHARM REV CODE 250: Performed by: EMERGENCY MEDICINE

## 2020-02-20 PROCEDURE — 80053 COMPREHEN METABOLIC PANEL: CPT

## 2020-02-20 PROCEDURE — 93005 ELECTROCARDIOGRAM TRACING: CPT

## 2020-02-20 RX ORDER — SODIUM CHLORIDE 0.9 % (FLUSH) 0.9 %
10 SYRINGE (ML) INJECTION
Status: DISCONTINUED | OUTPATIENT
Start: 2020-02-20 | End: 2020-02-21 | Stop reason: HOSPADM

## 2020-02-20 RX ORDER — ONDANSETRON 2 MG/ML
4 INJECTION INTRAMUSCULAR; INTRAVENOUS EVERY 8 HOURS PRN
Status: DISCONTINUED | OUTPATIENT
Start: 2020-02-20 | End: 2020-02-21 | Stop reason: HOSPADM

## 2020-02-20 RX ORDER — BACITRACIN 500 [USP'U]/G
OINTMENT TOPICAL
Status: COMPLETED | OUTPATIENT
Start: 2020-02-20 | End: 2020-02-20

## 2020-02-20 RX ORDER — ASPIRIN 325 MG
325 TABLET ORAL
Status: COMPLETED | OUTPATIENT
Start: 2020-02-20 | End: 2020-02-20

## 2020-02-20 RX ORDER — ACETAMINOPHEN 325 MG/1
650 TABLET ORAL EVERY 4 HOURS PRN
Status: DISCONTINUED | OUTPATIENT
Start: 2020-02-20 | End: 2020-02-21 | Stop reason: HOSPADM

## 2020-02-20 RX ORDER — GABAPENTIN 300 MG/1
600 CAPSULE ORAL NIGHTLY
Status: DISCONTINUED | OUTPATIENT
Start: 2020-02-20 | End: 2020-02-21

## 2020-02-20 RX ADMIN — ASPIRIN 325 MG ORAL TABLET 325 MG: 325 PILL ORAL at 09:02

## 2020-02-20 RX ADMIN — ACETAMINOPHEN 650 MG: 325 TABLET ORAL at 10:02

## 2020-02-20 RX ADMIN — GABAPENTIN 600 MG: 300 CAPSULE ORAL at 10:02

## 2020-02-20 RX ADMIN — BACITRACIN: 500 OINTMENT TOPICAL at 08:02

## 2020-02-21 VITALS
WEIGHT: 218.06 LBS | TEMPERATURE: 98 F | SYSTOLIC BLOOD PRESSURE: 174 MMHG | DIASTOLIC BLOOD PRESSURE: 76 MMHG | RESPIRATION RATE: 18 BRPM | BODY MASS INDEX: 27.98 KG/M2 | HEIGHT: 74 IN | HEART RATE: 62 BPM | OXYGEN SATURATION: 94 %

## 2020-02-21 PROBLEM — S00.83XA BRUISE OF FACE: Status: ACTIVE | Noted: 2020-02-21

## 2020-02-21 LAB
ANION GAP SERPL CALC-SCNC: 9 MMOL/L (ref 8–16)
AORTIC ROOT ANNULUS: 4.73 CM
ASCENDING AORTA: 4.4 CM
AV INDEX (PROSTH): 0.92
AV MEAN GRADIENT: 11 MMHG
AV PEAK GRADIENT: 23 MMHG
AV VALVE AREA: 5.91 CM2
AV VELOCITY RATIO: 0.69
BASOPHILS # BLD AUTO: 0.05 K/UL (ref 0–0.2)
BASOPHILS NFR BLD: 0.6 % (ref 0–1.9)
BSA FOR ECHO PROCEDURE: 2.28 M2
BUN SERPL-MCNC: 16 MG/DL (ref 8–23)
CALCIUM SERPL-MCNC: 9.6 MG/DL (ref 8.7–10.5)
CHLORIDE SERPL-SCNC: 107 MMOL/L (ref 95–110)
CO2 SERPL-SCNC: 25 MMOL/L (ref 23–29)
CREAT SERPL-MCNC: 0.8 MG/DL (ref 0.5–1.4)
CV ECHO LV RWT: 0.46 CM
DIFFERENTIAL METHOD: NORMAL
DOP CALC AO PEAK VEL: 2.38 M/S
DOP CALC AO VTI: 47.95 CM
DOP CALC LVOT AREA: 6.4 CM2
DOP CALC LVOT DIAMETER: 2.86 CM
DOP CALC LVOT PEAK VEL: 1.65 M/S
DOP CALC LVOT STROKE VOLUME: 283.23 CM3
DOP CALCLVOT PEAK VEL VTI: 44.11 CM
E WAVE DECELERATION TIME: 355.86 MSEC
E/A RATIO: 0.71
E/E' RATIO: 11.78 M/S
ECHO LV POSTERIOR WALL: 1.46 CM (ref 0.6–1.1)
EOSINOPHIL # BLD AUTO: 0.3 K/UL (ref 0–0.5)
EOSINOPHIL NFR BLD: 3 % (ref 0–8)
ERYTHROCYTE [DISTWIDTH] IN BLOOD BY AUTOMATED COUNT: 13 % (ref 11.5–14.5)
EST. GFR  (AFRICAN AMERICAN): >60 ML/MIN/1.73 M^2
EST. GFR  (NON AFRICAN AMERICAN): >60 ML/MIN/1.73 M^2
ESTIMATED AVG GLUCOSE: 105 MG/DL (ref 68–131)
FRACTIONAL SHORTENING: 38 % (ref 28–44)
GLUCOSE SERPL-MCNC: 98 MG/DL (ref 70–110)
HBA1C MFR BLD HPLC: 5.3 % (ref 4–5.6)
HCT VFR BLD AUTO: 42.7 % (ref 40–54)
HGB BLD-MCNC: 14.1 G/DL (ref 14–18)
IMM GRANULOCYTES # BLD AUTO: 0.02 K/UL (ref 0–0.04)
IMM GRANULOCYTES NFR BLD AUTO: 0.2 % (ref 0–0.5)
INTERVENTRICULAR SEPTUM: 1.75 CM (ref 0.6–1.1)
LA MAJOR: 5.03 CM
LA MINOR: 5.68 CM
LA WIDTH: 3.88 CM
LEFT ATRIUM SIZE: 3.32 CM
LEFT ATRIUM VOLUME INDEX: 25.9 ML/M2
LEFT ATRIUM VOLUME: 58.42 CM3
LEFT INTERNAL DIMENSION IN SYSTOLE: 3.89 CM (ref 2.1–4)
LEFT VENTRICLE DIASTOLIC VOLUME INDEX: 89.88 ML/M2
LEFT VENTRICLE DIASTOLIC VOLUME: 202.61 ML
LEFT VENTRICLE MASS INDEX: 227 G/M2
LEFT VENTRICLE SYSTOLIC VOLUME INDEX: 29 ML/M2
LEFT VENTRICLE SYSTOLIC VOLUME: 65.38 ML
LEFT VENTRICULAR INTERNAL DIMENSION IN DIASTOLE: 6.32 CM (ref 3.5–6)
LEFT VENTRICULAR MASS: 510.69 G
LV LATERAL E/E' RATIO: 13.25 M/S
LV SEPTAL E/E' RATIO: 10.6 M/S
LYMPHOCYTES # BLD AUTO: 1.8 K/UL (ref 1–4.8)
LYMPHOCYTES NFR BLD: 20.1 % (ref 18–48)
MAGNESIUM SERPL-MCNC: 2.1 MG/DL (ref 1.6–2.6)
MCH RBC QN AUTO: 30.2 PG (ref 27–31)
MCHC RBC AUTO-ENTMCNC: 33 G/DL (ref 32–36)
MCV RBC AUTO: 91 FL (ref 82–98)
MONOCYTES # BLD AUTO: 0.7 K/UL (ref 0.3–1)
MONOCYTES NFR BLD: 7.9 % (ref 4–15)
MV PEAK A VEL: 0.75 M/S
MV PEAK E VEL: 0.53 M/S
NEUTROPHILS # BLD AUTO: 6.1 K/UL (ref 1.8–7.7)
NEUTROPHILS NFR BLD: 68.2 % (ref 38–73)
NRBC BLD-RTO: 0 /100 WBC
PHOSPHATE SERPL-MCNC: 3.3 MG/DL (ref 2.7–4.5)
PISA TR MAX VEL: 1.97 M/S
PLATELET # BLD AUTO: 176 K/UL (ref 150–350)
PMV BLD AUTO: 9.3 FL (ref 9.2–12.9)
POCT GLUCOSE: 82 MG/DL (ref 70–110)
POCT GLUCOSE: 94 MG/DL (ref 70–110)
POTASSIUM SERPL-SCNC: 3.8 MMOL/L (ref 3.5–5.1)
RA MAJOR: 4.78 CM
RA PRESSURE: 8 MMHG
RA WIDTH: 4.08 CM
RBC # BLD AUTO: 4.67 M/UL (ref 4.6–6.2)
SODIUM SERPL-SCNC: 141 MMOL/L (ref 136–145)
STJ: 4.2 CM
TDI LATERAL: 0.04 M/S
TDI SEPTAL: 0.05 M/S
TDI: 0.05 M/S
TR MAX PG: 16 MMHG
TRICUSPID ANNULAR PLANE SYSTOLIC EXCURSION: 2.82 CM
TROPONIN I SERPL DL<=0.01 NG/ML-MCNC: 0.02 NG/ML (ref 0–0.03)
TROPONIN I SERPL DL<=0.01 NG/ML-MCNC: 0.04 NG/ML (ref 0–0.03)
TV REST PULMONARY ARTERY PRESSURE: 24 MMHG
WBC # BLD AUTO: 8.99 K/UL (ref 3.9–12.7)

## 2020-02-21 PROCEDURE — 36415 COLL VENOUS BLD VENIPUNCTURE: CPT

## 2020-02-21 PROCEDURE — 83735 ASSAY OF MAGNESIUM: CPT

## 2020-02-21 PROCEDURE — 25000003 PHARM REV CODE 250: Performed by: NURSE PRACTITIONER

## 2020-02-21 PROCEDURE — 80048 BASIC METABOLIC PNL TOTAL CA: CPT

## 2020-02-21 PROCEDURE — 85025 COMPLETE CBC W/AUTO DIFF WBC: CPT

## 2020-02-21 PROCEDURE — 84484 ASSAY OF TROPONIN QUANT: CPT | Mod: 91

## 2020-02-21 PROCEDURE — G0378 HOSPITAL OBSERVATION PER HR: HCPCS

## 2020-02-21 PROCEDURE — 84100 ASSAY OF PHOSPHORUS: CPT

## 2020-02-21 RX ORDER — GABAPENTIN 300 MG/1
600 CAPSULE ORAL 2 TIMES DAILY
Status: DISCONTINUED | OUTPATIENT
Start: 2020-02-21 | End: 2020-02-21 | Stop reason: HOSPADM

## 2020-02-21 RX ORDER — PRAVASTATIN SODIUM 40 MG/1
40 TABLET ORAL NIGHTLY
Status: DISCONTINUED | OUTPATIENT
Start: 2020-02-21 | End: 2020-02-21 | Stop reason: HOSPADM

## 2020-02-21 RX ORDER — LISINOPRIL 20 MG/1
20 TABLET ORAL DAILY
Status: DISCONTINUED | OUTPATIENT
Start: 2020-02-21 | End: 2020-02-21 | Stop reason: HOSPADM

## 2020-02-21 RX ORDER — FUROSEMIDE 20 MG/1
20 TABLET ORAL DAILY
Status: DISCONTINUED | OUTPATIENT
Start: 2020-02-21 | End: 2020-02-21 | Stop reason: HOSPADM

## 2020-02-21 RX ORDER — ASPIRIN 81 MG/1
81 TABLET ORAL DAILY
Status: DISCONTINUED | OUTPATIENT
Start: 2020-02-21 | End: 2020-02-21 | Stop reason: HOSPADM

## 2020-02-21 RX ORDER — ALLOPURINOL 100 MG/1
300 TABLET ORAL DAILY
Status: DISCONTINUED | OUTPATIENT
Start: 2020-02-21 | End: 2020-02-21 | Stop reason: HOSPADM

## 2020-02-21 RX ADMIN — ACETAMINOPHEN 650 MG: 325 TABLET ORAL at 08:02

## 2020-02-21 RX ADMIN — ASPIRIN 81 MG: 81 TABLET, COATED ORAL at 08:02

## 2020-02-21 RX ADMIN — FUROSEMIDE 20 MG: 20 TABLET ORAL at 08:02

## 2020-02-21 RX ADMIN — ALLOPURINOL 300 MG: 100 TABLET ORAL at 08:02

## 2020-02-21 RX ADMIN — GABAPENTIN 600 MG: 300 CAPSULE ORAL at 08:02

## 2020-02-21 RX ADMIN — LISINOPRIL 20 MG: 20 TABLET ORAL at 08:02

## 2020-02-21 NOTE — PROGRESS NOTES
The Syd contacted Dr. Tyler's office [phone]  to reschedule the pt's appointment from 4-23-20 to something earlier. She states the 's nurse is out but she will pass the message along to her to call the pt to get him something earlier. She will f/u with the pt on Monday and confirmed the contact info.

## 2020-02-21 NOTE — SUBJECTIVE & OBJECTIVE
Past Medical History:   Diagnosis Date    Diabetes mellitus, type 2 4/20/2016    3/4/2016: Fasting Glucose 139.    Disc herniation     Essential hypertension 2/18/2016 2010:  Diagnosed.    History of gout 2/18/2016 1995: Diagnosed. Feet, elbow and knee.     Hypercholesterolemia 2/18/2016 2013: Began statin.    Hyperlipemia     Hypertension     Nonrheumatic aortic insufficiency with aortic stenosis 2/18/2016 9/9/2015: Echo: Normal left ventricular size and systolic function. Moderate LVH. Mild diastolic dysfunction. Mildly dilated LA. Mild aortic valve sclerosis. Mild to moderate AR.    Shortness of breath 6/26/2019 1/2019: Began experience SOB at night.       Past Surgical History:   Procedure Laterality Date    COLONOSCOPY      epidural steroid injection      HERNIA REPAIR         Review of patient's allergies indicates:  No Known Allergies    No current facility-administered medications on file prior to encounter.      Current Outpatient Medications on File Prior to Encounter   Medication Sig    gabapentin (NEURONTIN) 300 MG capsule Take one in the morning, one in the afternoon and two at bedtime for 5 days.  Then, two in the morning, one in the afternoon and two at bedtime for 5 days.  Then, two in the morning, two in the afternoon and two at bedtime if tolerated.    alendronate-vitamin D3 (FOSAMAX PLUS D) 70-2,800 mg-unit per tablet Take 1 tablet by mouth every 7 days.    allopurinol (ZYLOPRIM) 300 MG tablet Take 300 mg by mouth once daily.     aspirin (ECOTRIN) 81 MG EC tablet Take 81 mg by mouth once daily.    b complex vitamins tablet Take 1 tablet by mouth once daily.    docusate sodium (COLACE) 100 MG capsule Take 1 capsule (100 mg total) by mouth 2 (two) times daily.    ergocalciferol (ERGOCALCIFEROL) 50,000 unit Cap Take 50,000 Units by mouth every 7 days.    fish oil-omega-3 fatty acids 300-1,000 mg capsule Take 2 g by mouth once daily.    furosemide (LASIX) 20 MG  tablet Take 1 tablet (20 mg total) by mouth once daily.    hydrocodone-acetaminophen 10-325mg (NORCO)  mg Tab Take 1 tablet by mouth 3 (three) times daily as needed.     lisinopril (PRINIVIL,ZESTRIL) 40 MG tablet Take 1 tablet (40 mg total) by mouth once daily. (Patient taking differently: Take 20 mg by mouth once daily. )    multivitamin (THERAGRAN) per tablet Take 1 tablet by mouth once daily.    pravastatin (PRAVACHOL) 40 MG tablet Take 40 mg by mouth every evening.     tamsulosin (FLOMAX) 0.4 mg Cp24 Take 1 capsule (0.4 mg total) by mouth once daily.    [DISCONTINUED] amLODIPine (NORVASC) 5 MG tablet Take 1 tablet (5 mg total) by mouth once daily.     Family History     None        Tobacco Use    Smoking status: Never Smoker    Smokeless tobacco: Never Used   Substance and Sexual Activity    Alcohol use: Yes     Comment: social    Drug use: No    Sexual activity: Not on file     Review of Systems   Constitutional: Negative for chills and fever.   Eyes: Negative for photophobia.   Respiratory: Negative for cough, chest tightness, shortness of breath and wheezing.    Cardiovascular: Negative for chest pain, palpitations and leg swelling.   Gastrointestinal: Negative for abdominal pain, diarrhea, nausea and vomiting.   Genitourinary: Negative for dysuria, flank pain and hematuria.   Musculoskeletal: Negative for back pain, myalgias and neck pain.   Skin: Negative for rash and wound.   Neurological: Positive for dizziness and syncope. Negative for headaches.   Psychiatric/Behavioral: Negative for agitation.     Objective:     Vital Signs (Most Recent):  Temp: 98.6 °F (37 °C) (02/21/20 0339)  Pulse: 61 (02/21/20 0359)  Resp: 16 (02/21/20 0339)  BP: 123/60 (02/21/20 0339)  SpO2: 97 % (02/21/20 0339) Vital Signs (24h Range):  Temp:  [98.2 °F (36.8 °C)-98.6 °F (37 °C)] 98.6 °F (37 °C)  Pulse:  [57-75] 61  Resp:  [16-21] 16  SpO2:  [96 %-99 %] 97 %  BP: (123-174)/(60-77) 123/60     Weight: 98.9 kg (218  lb 0.6 oz)  Body mass index is 27.99 kg/m².    Physical Exam   Constitutional: He is oriented to person, place, and time. He appears well-developed and well-nourished. No distress.   HENT:   Head: Normocephalic and atraumatic.   Eyes: Pupils are equal, round, and reactive to light. Conjunctivae are normal.   Neck: Normal range of motion. Neck supple. No JVD present.   Cardiovascular: Normal rate, regular rhythm and intact distal pulses.   Murmur heard.  Pulmonary/Chest: Effort normal and breath sounds normal. No respiratory distress. He has no wheezes.   Abdominal: Soft. Bowel sounds are normal. He exhibits no distension. There is no tenderness. There is no guarding.   Musculoskeletal: Normal range of motion. He exhibits no edema or tenderness.   Neurological: He is alert and oriented to person, place, and time.   Skin: Skin is warm and dry. Capillary refill takes less than 2 seconds. No erythema.   Psychiatric: He has a normal mood and affect. His behavior is normal.         CRANIAL NERVES     CN III, IV, VI   Pupils are equal, round, and reactive to light.       Significant Labs:   BMP:   Recent Labs   Lab 02/21/20 0224   GLU 98      K 3.8      CO2 25   BUN 16   CREATININE 0.8   CALCIUM 9.6   MG 2.1     CBC:   Recent Labs   Lab 02/20/20 1952 02/21/20 0224   WBC 9.76 8.99   HGB 14.4 14.1   HCT 43.5 42.7    176       Significant Imaging: I have reviewed all pertinent imaging results/findings within the past 24 hours.
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
## 2020-02-21 NOTE — PLAN OF CARE
Pt being d/c in stable condition. All goals met adequate to d/c. Pt educated at d/c on plan of care, treatment at home, and any f/u visits. Pt v/u of all instructions and has no further questions at this time.
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
## 2020-02-21 NOTE — ASSESSMENT & PLAN NOTE
Previous episodes of syncope.   trend troponin:   Recent Labs   Lab 02/20/20 1952 02/21/20 0224 02/21/20  0822   TROPONINI 0.076* 0.044* 0.022     carotid US No evidence of a hemodynamically significant carotid bifurcation stenosis.        TTE   · Normal left ventricular systolic function. The estimated ejection fraction is 60%.  · Concentric left ventricular hypertrophy.  · Grade I (mild) left ventricular diastolic dysfunction consistent with impaired relaxation.  · No wall motion abnormalities.
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
## 2020-02-21 NOTE — SUBJECTIVE & OBJECTIVE
Interval History: awake and alert, no reported event. Patient stated he had similar episodes years apart in the past. He is followed by cardiologist Dr. Tyler who he is schedule to see in April but will be moved up.        Review of Systems   Constitutional: Negative for chills and fever.   Respiratory: Negative for cough, shortness of breath and wheezing.    Cardiovascular: Negative for chest pain, palpitations and leg swelling.   Gastrointestinal: Negative for abdominal pain, diarrhea and nausea.   Genitourinary: Negative for flank pain and hematuria.   Musculoskeletal: Negative for back pain and neck pain.   Skin: Positive for color change and wound. Negative for rash.   Neurological: Negative for syncope and headaches.   Psychiatric/Behavioral: Negative for agitation.     Objective:     Vital Signs (Most Recent):  Temp: 98.1 °F (36.7 °C) (02/21/20 1121)  Pulse: 60 (02/21/20 1151)  Resp: 18 (02/21/20 1121)  BP: (!) 159/71 (02/21/20 1121)  SpO2: 96 % (02/21/20 1121) Vital Signs (24h Range):  Temp:  [98.1 °F (36.7 °C)-98.6 °F (37 °C)] 98.1 °F (36.7 °C)  Pulse:  [57-75] 60  Resp:  [16-21] 18  SpO2:  [94 %-99 %] 96 %  BP: (123-174)/(60-77) 159/71     Weight: 98.9 kg (218 lb 0.6 oz)  Body mass index is 27.99 kg/m².    Intake/Output Summary (Last 24 hours) at 2/21/2020 1437  Last data filed at 2/21/2020 0900  Gross per 24 hour   Intake 240 ml   Output --   Net 240 ml      Physical Exam   Constitutional: He is oriented to person, place, and time. He appears well-developed and well-nourished. No distress.   HENT:   Head: Normocephalic and atraumatic.   Neck: Neck supple.   Cardiovascular: Normal rate, regular rhythm and intact distal pulses.   Murmur heard.  Pulmonary/Chest: Effort normal and breath sounds normal. No respiratory distress. He has no wheezes.   Abdominal: Soft. Bowel sounds are normal. He exhibits no distension. There is no tenderness. There is no guarding.   Musculoskeletal: Normal range of motion. He  exhibits no edema or tenderness.   Neurological: He is alert and oriented to person, place, and time.   Skin: Skin is warm and dry. Capillary refill takes less than 2 seconds. No erythema.   Right facial bruise and occipital soft tissue swelling and bruise.    Psychiatric: He has a normal mood and affect.       Significant Labs:   CBC:   Recent Labs   Lab 02/20/20 1952 02/21/20 0224   WBC 9.76 8.99   HGB 14.4 14.1   HCT 43.5 42.7    176     CMP:   Recent Labs   Lab 02/20/20 1952 02/21/20 0224    141   K 4.4 3.8    107   CO2 25 25   * 98   BUN 17 16   CREATININE 0.9 0.8   CALCIUM 10.2 9.6   PROT 6.9  --    ALBUMIN 3.8  --    BILITOT 0.3  --    ALKPHOS 66  --    AST 25  --    ALT 18  --    ANIONGAP 10 9   EGFRNONAA >60 >60     Cardiac Markers: No results for input(s): CKMB, MYOGLOBIN, BNP, TROPISTAT in the last 48 hours.  Coagulation: No results for input(s): PT, INR, APTT in the last 48 hours.  Pathology Results  (Last 10 years)    None        Troponin:   Recent Labs   Lab 02/20/20 1952 02/21/20 0224 02/21/20  0822   TROPONINI 0.076* 0.044* 0.022     TSH: No results for input(s): TSH in the last 4320 hours.    Significant Imaging: I have reviewed all pertinent imaging results/findings within the past 24 hours.

## 2020-02-21 NOTE — DISCHARGE INSTRUCTIONS
Syncope, Treating: Helping Your Heart (English) View Edit Remove   Syncope, Treating: Prevention (English) View Edit Remove   Syncope, Unk Cause (English) View Edit Remove   Syncope, Vasovagal (English) View Edit Remove   Syncope, What is (English) View Edit Remove

## 2020-02-21 NOTE — ASSESSMENT & PLAN NOTE
65 yo male presented to ED with c/o  Dizziness followed by syncopal event. He has known hx of nonrheumatic aortic insufficiency with aortic stenosis. Last echo 1/2019 revealed a moderately dilated left ventricle with normal systolic function with an EF 55-60%. There was mild LVH with mild diastolic dysfunction. There was moderate aortic valve sclerosis with apeak velocity of 2.2 m/s and moderate AR. Pt denies unilateral weakness, aphasia and facial droop. Troponin 0.076 on admit. EKG shows SR with 1st degree AV block (consitent with previous). Pt denies chest pain, SOB and palpitations.     Check TTE, trend troponin, check carotid US and orthostatic vitals. Monitor tele. Consider cardiology eval.
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
## 2020-02-21 NOTE — NURSING
Notified by monitor tech, patient had 4 beat run of VTACH. Patient is resting in bed with no complaints at this time. /60 HR 57. JOSUE Mena notified. Will continue to monitor.
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
## 2020-02-21 NOTE — ED NOTES
Patient identifiers for Dominguez Hutton checked and correct.  LOC: The patient is awake, alert and aware of environment with an appropriate affect, the patient is oriented x 3 and speaking appropriately.  APPEARANCE: Patient resting comfortably and in no acute distress, patient is clean and well groomed, patient's clothing are properly fastened.  SKIN: The skin is warm and dry, patient has normal skin turgor and moist mucus membranes.  Hematoma to back of head.  Abrasion to left temple area and left side of forehead.  Abrasion to right 5th finger. Very small scrapes to left fingers.  MUSKULOSKELETAL: Patient moving all extremities well, no obvious swelling or deformities noted.  RESPIRATORY: Airway is open and patent, respirations are spontaneous, patient has a normal effort and rate.  NEUROLOGIC: PERRL, facial expression is symmetrical, bilateral hand grasp equal and even, normal sensation in all extremities when touched with a finger.

## 2020-03-16 ENCOUNTER — OFFICE VISIT (OUTPATIENT)
Dept: CARDIOLOGY | Facility: CLINIC | Age: 65
End: 2020-03-16
Attending: INTERNAL MEDICINE
Payer: COMMERCIAL

## 2020-03-16 VITALS
HEART RATE: 58 BPM | BODY MASS INDEX: 29.26 KG/M2 | HEIGHT: 74 IN | DIASTOLIC BLOOD PRESSURE: 62 MMHG | SYSTOLIC BLOOD PRESSURE: 139 MMHG | WEIGHT: 228 LBS

## 2020-03-16 DIAGNOSIS — I10 ESSENTIAL HYPERTENSION: ICD-10-CM

## 2020-03-16 DIAGNOSIS — Z87.39 HISTORY OF GOUT: ICD-10-CM

## 2020-03-16 DIAGNOSIS — E11.9 TYPE 2 DIABETES MELLITUS WITHOUT COMPLICATION, WITHOUT LONG-TERM CURRENT USE OF INSULIN: ICD-10-CM

## 2020-03-16 DIAGNOSIS — E78.00 HYPERCHOLESTEROLEMIA: ICD-10-CM

## 2020-03-16 DIAGNOSIS — R55 SYNCOPE, UNSPECIFIED SYNCOPE TYPE: ICD-10-CM

## 2020-03-16 DIAGNOSIS — I35.1 NONRHEUMATIC AORTIC VALVE INSUFFICIENCY: ICD-10-CM

## 2020-03-16 DIAGNOSIS — E66.3 OVERWEIGHT: ICD-10-CM

## 2020-03-16 DIAGNOSIS — R06.02 SHORTNESS OF BREATH: ICD-10-CM

## 2020-03-16 PROCEDURE — 3008F PR BODY MASS INDEX (BMI) DOCUMENTED: ICD-10-PCS | Mod: CPTII,S$GLB,, | Performed by: INTERNAL MEDICINE

## 2020-03-16 PROCEDURE — 99214 PR OFFICE/OUTPT VISIT, EST, LEVL IV, 30-39 MIN: ICD-10-PCS | Mod: S$GLB,,, | Performed by: INTERNAL MEDICINE

## 2020-03-16 PROCEDURE — 3075F PR MOST RECENT SYSTOLIC BLOOD PRESS GE 130-139MM HG: ICD-10-PCS | Mod: CPTII,S$GLB,, | Performed by: INTERNAL MEDICINE

## 2020-03-16 PROCEDURE — 3078F DIAST BP <80 MM HG: CPT | Mod: CPTII,S$GLB,, | Performed by: INTERNAL MEDICINE

## 2020-03-16 PROCEDURE — 3044F HG A1C LEVEL LT 7.0%: CPT | Mod: CPTII,S$GLB,, | Performed by: INTERNAL MEDICINE

## 2020-03-16 PROCEDURE — 99214 OFFICE O/P EST MOD 30 MIN: CPT | Mod: S$GLB,,, | Performed by: INTERNAL MEDICINE

## 2020-03-16 PROCEDURE — 3078F PR MOST RECENT DIASTOLIC BLOOD PRESSURE < 80 MM HG: ICD-10-PCS | Mod: CPTII,S$GLB,, | Performed by: INTERNAL MEDICINE

## 2020-03-16 PROCEDURE — 3044F PR MOST RECENT HEMOGLOBIN A1C LEVEL <7.0%: ICD-10-PCS | Mod: CPTII,S$GLB,, | Performed by: INTERNAL MEDICINE

## 2020-03-16 PROCEDURE — 3008F BODY MASS INDEX DOCD: CPT | Mod: CPTII,S$GLB,, | Performed by: INTERNAL MEDICINE

## 2020-03-16 PROCEDURE — 3075F SYST BP GE 130 - 139MM HG: CPT | Mod: CPTII,S$GLB,, | Performed by: INTERNAL MEDICINE

## 2020-03-16 RX ORDER — AMLODIPINE BESYLATE 5 MG/1
5 TABLET ORAL DAILY
Qty: 90 TABLET | Refills: 3 | Status: SHIPPED | OUTPATIENT
Start: 2020-03-16

## 2020-03-16 RX ORDER — FUROSEMIDE 20 MG/1
20 TABLET ORAL DAILY
Qty: 90 TABLET | Refills: 3 | Status: SHIPPED | OUTPATIENT
Start: 2020-03-16

## 2020-03-16 RX ORDER — ASPIRIN 81 MG/1
81 TABLET ORAL DAILY
Qty: 90 TABLET | Refills: 3 | Status: SHIPPED | OUTPATIENT
Start: 2020-03-16

## 2020-03-16 RX ORDER — LISINOPRIL 40 MG/1
40 TABLET ORAL DAILY
Qty: 90 TABLET | Refills: 3 | Status: SHIPPED | OUTPATIENT
Start: 2020-03-16

## 2020-03-16 RX ORDER — PRAVASTATIN SODIUM 40 MG/1
40 TABLET ORAL DAILY
Qty: 90 TABLET | Refills: 3 | Status: SHIPPED | OUTPATIENT
Start: 2020-03-16

## 2020-03-16 NOTE — PROGRESS NOTES
Subjective:     Dominguez Hutton is a 64 y.o. male with hypertension, hypercholesterolemia and diet controlled diabetes mellitus type 2. He has a lot of back pains and has received several DANDRE over the last several years. Because of a murmur he underwent an Echocardiogram on 9/9/2015 that revealed some aortic valve disease with mild to moderate aortic regurgitations. In 1/2019 he began experience orthopnea at [facility] that resolved as he was sitting up. On 7/2/2019 he had an Echo that revealed a moderately dilated left ventricle with normal systolic function with an EF 55-60%. There was mild LVH with mild diastolic dysfunction. There was moderate aortic valve sclerosis with apeak velocity of 2.2 m/s and moderate AR. A BNP was 495 but came down to 159 as he began furosemide 20 mg Q24. On 2/20/2020 he presented to [facility] after syncope that occurred in the setting of fasting. Work up at [place] was negative. He was hypertensive during his stay. No exertional dyspnea or edema of his legs. He denies any exertional chest pain. No palpitations or weak spells. Feeling well except for his back pains.       Hypertension   This is a chronic problem. The current episode started more than 1 year ago. The problem is unchanged. The problem is uncontrolled (usually 140-160/60-70 mmHg at home). Pertinent negatives include no anxiety, blurred vision, chest pain, headaches, malaise/fatigue, neck pain, orthopnea, palpitations, peripheral edema, PND, shortness of breath or sweats. There is no history of chronic renal disease.   Hyperlipidemia   This is a chronic problem. The current episode started more than 1 year ago. The problem is controlled. Recent lipid tests were reviewed and are normal. He has no history of chronic renal disease, diabetes, hypothyroidism, liver disease, obesity or nephrotic syndrome. Pertinent negatives include no chest pain, focal sensory loss, focal weakness, leg pain, myalgias or shortness of breath.   Shortness  of Breath   This is a new problem. The current episode started more than 1 month ago. The problem occurs daily. The problem has been resolved. Associated symptoms include syncope. Pertinent negatives include no abdominal pain, chest pain, claudication, coryza, ear pain, fever, headaches, hemoptysis, leg pain, leg swelling, neck pain, orthopnea, PND, rash, rhinorrhea, sore throat, sputum production, swollen glands, vomiting or wheezing.       Review of Systems   Constitution: Negative for chills, fever, malaise/fatigue, night sweats, weight gain and weight loss.   HENT: Negative for ear pain, nosebleeds, rhinorrhea and sore throat.    Eyes: Negative for blurred vision, vision loss in left eye and vision loss in right eye.   Cardiovascular: Positive for syncope. Negative for chest pain, claudication, dyspnea on exertion, irregular heartbeat, leg swelling, near-syncope, orthopnea, palpitations and paroxysmal nocturnal dyspnea.   Respiratory: Negative for cough, hemoptysis, shortness of breath, sleep disturbances due to breathing, sputum production and wheezing.    Endocrine: Negative for cold intolerance and heat intolerance.   Hematologic/Lymphatic: Negative for bleeding problem. Does not bruise/bleed easily.   Skin: Negative for color change and rash.   Musculoskeletal: Positive for back pain and gout. Negative for arthritis, falls, joint swelling, muscle cramps, myalgias and neck pain.   Gastrointestinal: Negative for abdominal pain, heartburn, hematemesis, hematochezia, hemorrhoids, jaundice, melena, nausea and vomiting.   Genitourinary: Negative for dysuria and hematuria.   Neurological: Negative for dizziness, focal weakness, headaches, light-headedness, loss of balance, numbness, paresthesias and vertigo.   Psychiatric/Behavioral: Negative for altered mental status, depression and substance abuse. The patient is not nervous/anxious.    Allergic/Immunologic: Negative for hives and persistent infections.  "      Current Outpatient Medications on File Prior to Visit   Medication Sig Dispense Refill    alendronate-vitamin D3 (FOSAMAX PLUS D) 70-2,800 mg-unit per tablet Take 1 tablet by mouth every 7 days.      allopurinol (ZYLOPRIM) 300 MG tablet Take 300 mg by mouth once daily.       aspirin (ECOTRIN) 81 MG EC tablet Take 81 mg by mouth once daily.      b complex vitamins tablet Take 1 tablet by mouth once daily.      ergocalciferol (ERGOCALCIFEROL) 50,000 unit Cap Take 50,000 Units by mouth every 7 days.      fish oil-omega-3 fatty acids 300-1,000 mg capsule Take 1 capsule by mouth once daily.       furosemide (LASIX) 20 MG tablet Take 1 tablet (20 mg total) by mouth once daily. 90 tablet 3    gabapentin (NEURONTIN) 300 MG capsule Take one in the morning, one in the afternoon and two at bedtime for 5 days.  Then, two in the morning, one in the afternoon and two at bedtime for 5 days.  Then, two in the morning, two in the afternoon and two at bedtime if tolerated. (Patient taking differently: 600 mg 2 (two) times daily. ) 180 capsule 1    hydrocodone-acetaminophen 10-325mg (NORCO)  mg Tab Take 1 tablet by mouth 3 (three) times daily as needed.   0    lisinopril (PRINIVIL,ZESTRIL) 40 MG tablet Take 1 tablet (40 mg total) by mouth once daily. (Patient taking differently: Take 20 mg by mouth once daily. ) 90 tablet 3    multivitamin (THERAGRAN) per tablet Take 1 tablet by mouth once daily.      pravastatin (PRAVACHOL) 40 MG tablet Take 40 mg by mouth every evening.       tamsulosin (FLOMAX) 0.4 mg Cp24 Take 1 capsule (0.4 mg total) by mouth once daily. 30 capsule 11     No current facility-administered medications on file prior to visit.        /62   Pulse (!) 58   Ht 6' 2" (1.88 m)   Wt 103.4 kg (228 lb)   BMI 29.27 kg/m²       Objective:     Physical Exam   Constitutional: He is oriented to person, place, and time. He appears well-developed and well-nourished. No distress.   HENT:   Head: " Normocephalic and atraumatic.   Mouth/Throat: No oropharyngeal exudate.   Eyes: Conjunctivae are normal. Right eye exhibits no discharge. Left eye exhibits no discharge. No scleral icterus.   Neck: Neck supple. No JVD present. Carotid bruit is not present. No thyromegaly present.   Cardiovascular: Normal rate, regular rhythm, S1 normal and S2 normal. Exam reveals no gallop, no S3 and no S4.   Murmur heard.   Harsh midsystolic murmur is present with a grade of 2/6 at the upper right sternal border radiating to the neck.  High-pitched blowing decrescendo early diastolic murmur is present with a grade of 3/6 at the upper right sternal border radiating to the apex.  Pulses:       Radial pulses are 2+ on the right side, and 2+ on the left side.        Dorsalis pedis pulses are 2+ on the right side, and 2+ on the left side.        Posterior tibial pulses are 2+ on the right side, and 2+ on the left side.   Pulmonary/Chest: Effort normal and breath sounds normal.   Abdominal: Soft. Normal appearance.   Musculoskeletal:        Right ankle: He exhibits no swelling, no ecchymosis and no deformity.        Left ankle: He exhibits no swelling, no ecchymosis and no deformity.   Lymphadenopathy:        Head (right side): No submandibular adenopathy present.        Head (left side): No submandibular adenopathy present.     He has no cervical adenopathy.   Neurological: He is alert and oriented to person, place, and time.   Skin: Skin is warm, dry and intact. Rash noted. He is not diaphoretic.   Psychiatric: He has a normal mood and affect. His speech is normal and behavior is normal. Judgment and thought content normal. Cognition and memory are normal.       Assessment:     1. Nonrheumatic aortic valve insufficiency    2. Shortness of breath    3. Syncope, unspecified syncope type    4. Essential hypertension    5. Hypercholesterolemia    6. Type 2 diabetes mellitus without complication, without long-term current use of insulin     7. Overweight    8. History of gout        Plan:     1. Aortic Valve Disease   9/9/2015: Echo: Normal left ventricular size and systolic function. Moderate LVH. Mild diastolic dysfunction. Mildly dilated LA. Mild aortic valve sclerosis. Mild to moderate AR.   7/2/2019: Echo: Moderately dilated left ventricle with normal systolic function. EF 55-60%. Mild LVH. Mild diastolic dysfunction. Moderate aortic valve sclerosis - 2.2 m/s. Moderate AR.   1/2019: Began experience orthopnea.   4/23/2019: .   7/2/2019: CXR: Clear.    7/3/2019: .   On lisinopril 20 mg Q24 and furosemide 20 mg Q24.   Control risk factors - especially blood pressure.   Doing well.    2. Syncope   2/20/2020: [facility]: Admitted after syncope. Not been eating that day.    3. Hypertension   2010: Diagnosed.   Used to take amlodipine 10 mg Q24.   Used to be on hctz 12.5 mg Q24.   On lisinopril 40 mg Q24 and furosemide 20 mg Q24.   Keeping log at home.   Running high.   3/16/2020: Add amlodipine 5 mg Q24.    4. Hypercholesterolemia   2013: Began statin.   On pravastatin 40 mg Q24.   4/23/2019: Chol 137. HDL 42. LDL 69. .   On pravastatin 40 mg Q24.   Well controlled.    5. Diabetes Mellitus, Type 2   3/4/2016: Diagnosed. Fasting Glucose 139. Complications: None. Medications: Diet and lost weight.     Excellent after lost weight.    6. Mild Obesity   6/26/2019: Weight 109 kg. BMI 31.   Encouraged to lose weight.    7. History of Gout   1995: Diagnosed. Feet, elbow and knee.   On allopurinol 300 mg Q24.   No recurrence.    8. Back Pains   ESIs.    9. Primary Care   In [place].    F/u 2 months.    Tere Tyler M.D.

## 2022-03-21 NOTE — PRE-PROCEDURE INSTRUCTIONS
Pre op instructions reviewed with patient per phone:    To confirm, Your surgeon has instructed you:  Surgery is scheduled 2/21/17 at [facility].      Please report to [facility] 1st floor main lobby by ROBERT Marie's office to inform of arrival time.      INSTRUCTIONS IMPORTANT!!!  ¨ Do not eat, drink, or smoke after 12 midnight-including water. OK to brush teeth, no gum, candy or mints!    ¨ Take only these medicines with a small swallow of water-morning of surgery.  Lisinopril    ____  Do not wear makeup, including mascara.  ____  No powder, lotions or creams to surgical area.  ____  Please remove all jewelry, including piercings and leave at home.  ____  No money or valuables needed. Please leave at home.  ____  Please bring identification and insurance information to hospital.  ____  If going home the same day, arrange for a ride home. You will not be able to   drive if Anesthesia was used.  ____  Children, under 12 years old, must remain in the waiting room with an adult.  They are not allowed in patient areas.  ____  Wear loose fitting clothing. Allow for dressings, bandages.  ____  Stop Aspirin, Ibuprofen, Motrin and Aleve at least 5-7 days before surgery, unless otherwise instructed by your doctor, or the nurse.   You MAY use Tylenol/acetaminophen until day of surgery.  ____  If you take diabetic medication, do not take am of surgery unless instructed by   Doctor.  ____ Stop taking any Fish Oil supplement or any Vitamins that contain Vitamin E at least 5 days prior to surgery.          Bathing Instructions-- The night before surgery and the morning prior to coming to the hospital:   -Do not shave the surgical area.   -Shower and wash your hair and body as usual with your regular soap and shampoo.   -Rinse your hair and body completely.   -Use one packet of hibiclens to wash the surgical site (using your hand) gently for 5 minutes.  Do not scrub you skin too hard.   -Do not use  hibiclens on your head, face, or genitals.   -Do not wash with regular soap after you use the hibiclens.   -Rinse your body thoroughly.   -Dry with clean, soft towel.  Do not use lotion, cream, deodorant, or powders on   the surgical site.    Use antibacterial soap in place of hibiclens if your surgery is on the head, face or genitals.         Surgical Site Infection    Prevention of surgical site infections:     -Keep incisions clean and dry.   -Do not soak/submerge incisions in water until completely healed.   -Do not apply lotions, powders, creams, or deodorants to site.   -Always make sure hands are cleaned with antibacterial soap/ alcohol-based   prior to touching the surgical site.  (This includes doctors, nurses, staff, and yourself.)    Signs and symptoms:   -Redness and pain around the area where you had surgery   -Drainage of cloudy fluid from your surgical wound   -Fever over 100.4  I have read or had read and explained to me, and understand the above information.           Hemostasis: Electrofulguration

## 2022-03-30 ENCOUNTER — TELEPHONE (OUTPATIENT)
Dept: PAIN MEDICINE | Facility: CLINIC | Age: 67
End: 2022-03-30
Payer: MEDICARE

## 2022-03-30 NOTE — TELEPHONE ENCOUNTER
This message is for patient in regards to his/her appointment 3/31/22 at 9 am.      [facility] Policy: For the safety of all patients and staff members.     Patient Visitor policy: Due to social distancing and limited seating staff are requesting patient to arrive to their schedule appointments on time. During this visit we are asking all patients to only have one visitor over the age of 18yrs old to accompany them to be seen by . If patient do not required assistance with their visit, we're asking all visitors to remain outside the waiting area.    Upon arriving to your schedule appointment; patients are required to wear a face mask, if patient do not have a face mask one will be provided entering the facility. We ask patients to contact clinic at this number [phone] to notify staff that they have arrived or they may do so by utilizing the Mobile checked in [facility](if patient have patient portal; clinical staff will send a message through there letting them know it's okay to proceed to their visit). If you have any questions or concerns please contact [phone]       also inquired IPM within message.    [facility] providers and Mid-levels offer interventional, procedure--based options to treat chronic pain. The goal is to manage chronic pain by reducing pain frequency and intensity and address your functional goals for activities of daily living while simultaneously reducing or eliminating your reliance on medications. Please bring any records or images that you have from prior treatments for your pain. You will be presented with multi-modal treatment plan that may or may not include imaging, interventional procedures, physical/occupational/aqua therapy, pain creams, and non-narcotic pain medications used for the treatments of chronic pain.

## 2022-03-31 ENCOUNTER — TELEPHONE (OUTPATIENT)
Dept: PAIN MEDICINE | Facility: CLINIC | Age: 67
End: 2022-03-31
Payer: MEDICARE

## 2022-04-11 ENCOUNTER — OFFICE VISIT (OUTPATIENT)
Dept: SPINE | Facility: CLINIC | Age: 67
End: 2022-04-11
Attending: ANESTHESIOLOGY
Payer: MEDICARE

## 2022-04-11 VITALS
HEIGHT: 74 IN | SYSTOLIC BLOOD PRESSURE: 181 MMHG | DIASTOLIC BLOOD PRESSURE: 75 MMHG | HEART RATE: 59 BPM | WEIGHT: 227.94 LBS | BODY MASS INDEX: 29.25 KG/M2

## 2022-04-11 DIAGNOSIS — M75.41 SHOULDER IMPINGEMENT SYNDROME, RIGHT: ICD-10-CM

## 2022-04-11 DIAGNOSIS — M67.911 ROTATOR CUFF DYSFUNCTION, RIGHT: ICD-10-CM

## 2022-04-11 DIAGNOSIS — M25.511 RIGHT SHOULDER PAIN, UNSPECIFIED CHRONICITY: Primary | ICD-10-CM

## 2022-04-11 DIAGNOSIS — G54.0 BRACHIAL PLEXUS DISORDERS: ICD-10-CM

## 2022-04-11 PROCEDURE — 99999 PR PBB SHADOW E&M-EST. PATIENT-LVL IV: CPT | Mod: PBBFAC,,, | Performed by: ANESTHESIOLOGY

## 2022-04-11 PROCEDURE — 99204 OFFICE O/P NEW MOD 45 MIN: CPT | Mod: S$PBB,GC,, | Performed by: ANESTHESIOLOGY

## 2022-04-11 PROCEDURE — 99999 PR PBB SHADOW E&M-EST. PATIENT-LVL IV: ICD-10-PCS | Mod: PBBFAC,,, | Performed by: ANESTHESIOLOGY

## 2022-04-11 PROCEDURE — 99204 PR OFFICE/OUTPT VISIT, NEW, LEVL IV, 45-59 MIN: ICD-10-PCS | Mod: S$PBB,GC,, | Performed by: ANESTHESIOLOGY

## 2022-04-11 PROCEDURE — 99214 OFFICE O/P EST MOD 30 MIN: CPT | Mod: PBBFAC | Performed by: ANESTHESIOLOGY

## 2022-04-11 RX ORDER — CELECOXIB 100 MG/1
100 CAPSULE ORAL 2 TIMES DAILY
Qty: 60 CAPSULE | Refills: 0 | Status: SHIPPED | OUTPATIENT
Start: 2022-04-11 | End: 2022-05-11

## 2022-04-11 RX ORDER — TIZANIDINE 4 MG/1
4 TABLET ORAL NIGHTLY
Qty: 14 TABLET | Refills: 0 | Status: SHIPPED | OUTPATIENT
Start: 2022-04-11 | End: 2022-04-25

## 2022-04-11 NOTE — PROGRESS NOTES
Chronic Pain - New Consult    Referring Physician: Yahir, Aaareferral    Chief Complaint:   Chief Complaint   Patient presents with    Arm Pain     right        SUBJECTIVE:    Dominguez Hutton presents to the clinic for the evaluation of Right Shoulder  pain. The pain started 5 months  ago following flu shot  and symptoms have been worsening.The pain is located in the Right shoulder  area and radiates to the none.  The pain is described as sharp and is rated as 2/10. The pain is rated with a score of  2/10 on the BEST day and a score of 8/10 on the WORST day.  Symptoms interfere with daily activity, sleeping and glofing. The pain is exacerbated by lifting and  over head movements.  The pain is mitigated by medications and rest.     Patient denies night fever/night sweats, urinary incontinence, bowel incontinence, significant weight loss, significant motor weakness and loss of sensations.    Physical Therapy/Home Exercise: yes  ,FInished PT 2-3 weeks ago.    Pain Disability Index Review:  Last 3 PDI Scores 7/1/2016 5/16/2016 4/26/2016   Pain Disability Index (PDI) 21 22 50       Pain Medications:    Hydrocodone(prescribe by [facility]ctor in [place])  Gabapentin for LBP      report:  Reviewed and consistent with medication use as prescribed.    Pain Procedures: DANDRE in 2016     Imaging: none    Past Medical History:   Diagnosis Date    Diabetes mellitus, type 2 4/20/2016    3/4/2016: Fasting Glucose 139.    Disc herniation     Essential hypertension 2/18/2016 2010:  Diagnosed.    History of gout 2/18/2016 1995: Diagnosed. Feet, elbow and knee.     Hypercholesterolemia 2/18/2016 2013: Began statin.    Hyperlipemia     Hypertension     Nonrheumatic aortic insufficiency with aortic stenosis 2/18/2016 9/9/2015: Echo: Normal left ventricular size and systolic function. Moderate LVH. Mild diastolic dysfunction. Mildly dilated LA. Mild aortic valve sclerosis. Mild to moderate AR.    Shortness of breath  6/26/2019 1/2019: Began experience SOB at night.     Past Surgical History:   Procedure Laterality Date    COLONOSCOPY      epidural steroid injection      HERNIA REPAIR       Social History     Socioeconomic History    Marital status:    Tobacco Use    Smoking status: Never Smoker    Smokeless tobacco: Never Used   Substance and Sexual Activity    Alcohol use: Yes     Comment: social    Drug use: No     History reviewed. No pertinent family history.    Review of patient's allergies indicates:  No Known Allergies    Current Outpatient Medications   Medication Sig    alendronate-vitamin D3 (FOSAMAX PLUS D) 70-2,800 mg-unit per tablet Take 1 tablet by mouth every 7 days.    allopurinol (ZYLOPRIM) 300 MG tablet Take 300 mg by mouth once daily.     amLODIPine (NORVASC) 5 MG tablet Take 1 tablet (5 mg total) by mouth once daily.    aspirin (ECOTRIN) 81 MG EC tablet Take 1 tablet (81 mg total) by mouth once daily.    b complex vitamins tablet Take 1 tablet by mouth once daily.    ergocalciferol (ERGOCALCIFEROL) 50,000 unit Cap Take 50,000 Units by mouth every 7 days.    fish oil-omega-3 fatty acids 300-1,000 mg capsule Take 1 capsule by mouth once daily.     furosemide (LASIX) 20 MG tablet Take 1 tablet (20 mg total) by mouth once daily.    gabapentin (NEURONTIN) 300 MG capsule Take one in the morning, one in the afternoon and two at bedtime for 5 days.  Then, two in the morning, one in the afternoon and two at bedtime for 5 days.  Then, two in the morning, two in the afternoon and two at bedtime if tolerated. (Patient taking differently: 600 mg 2 (two) times daily.)    hydrocodone-acetaminophen 10-325mg (NORCO)  mg Tab Take 1 tablet by mouth 3 (three) times daily as needed.     lisinopriL (PRINIVIL,ZESTRIL) 40 MG tablet Take 1 tablet (40 mg total) by mouth once daily.    multivitamin (THERAGRAN) per tablet Take 1 tablet by mouth once daily.    pravastatin (PRAVACHOL) 40 MG tablet Take  "1 tablet (40 mg total) by mouth once daily.    tamsulosin (FLOMAX) 0.4 mg Cp24 Take 1 capsule (0.4 mg total) by mouth once daily.     No current facility-administered medications for this visit.       REVIEW OF SYSTEMS:    GENERAL:  No weight loss, malaise or fevers.  HEENT:  Negative for frequent or significant headaches.  NECK:  Negative for lumps, goiter, pain and significant neck swelling.  RESPIRATORY:  Negative for cough, wheezing or shortness of breath.  CARDIOVASCULAR:  Negative for chest pain, leg swelling or palpitations.  GI:  Negative for abdominal discomfort, blood in stools or black stools or change in bowel habits.  MUSCULOSKELETAL:  See HPI.  SKIN:  Negative for lesions, rash, and itching.  PSYCH:  Negative for sleep disturbance, mood disorder and recent psychosocial stressors.  HEMATOLOGY/LYMPHOLOGY:  Negative for prolonged bleeding, bruising easily or swollen nodes.  NEURO:   No history of headaches, syncope, paralysis, seizures or tremors.  All other reviewed and negative other than HPI.    OBJECTIVE:    BP (!) 181/75   Pulse (!) 59   Ht 6' 2" (1.88 m)   Wt 103.4 kg (227 lb 15.3 oz)   BMI 29.27 kg/m²     PHYSICAL EXAMINATION:    General appearance: Well appearing, in no acute distress, alert and oriented x3.  Psych:  Mood and affect appropriate.  Skin: Skin color, texture, turgor normal, no rashes or lesions, in both upper and lower body.  Head/face:  Normocephalic, atraumatic. No palpable lymph nodes.  Neck: No pain to palpation over the cervical paraspinous muscles. Spurling Negative. No pain with neck flexion, extension, or lateral flexion.   Back: Straight leg raising in the sitting and supine positions is negative to radicular pain. No pain to palpation over the spine or costovertebral angles. Normal range of motion without pain reproduction.  Extremities: Peripheral joint ROM is full and pain free without obvious instability or laxity in all four extremities. No deformities, edema, or " skin discoloration. Good capillary refill.  Right Shoulder:  Harkins Test + ,Neers test negative, Cross arm test negative. Shoulder abduction and adduction 4/5 .  Elbow and wrist  flexion/extnsion 5/5 , Hand  4/5   Musculoskeletal: Shoulder, hip, sacroiliac and knee provocative maneuvers are negative. lower extremity strength is normal and symmetric.  No atrophy or tone abnormalities are noted.  Neuro: Bilateral upper and lower extremity coordination and muscle stretch reflexes are physiologic and symmetric.  Plantar response are downgoing. No loss of sensation is noted.  Gait: normal.    ASSESSMENT: 66 y.o. year old male with Right Shoulder  pain, consistent with      1. Right shoulder pain, unspecified chronicity  MRI Shoulder W WO Contrast Right    EMG W/ ULTRASOUND AND NERVE CONDUCTION TEST 1 Extremity   2. Rotator cuff dysfunction, right     3. Brachial plexus disorders   EMG W/ ULTRASOUND AND NERVE CONDUCTION TEST 1 Extremity   4. Shoulder impingement syndrome, right         PLAN:     - I have stressed the importance of physical activity and a home exercise plan to help with pain and improve health.  - Order MRI of the Right shoulder with and without contrast to help evaluate possible source of pain.  - Continue  Neurontin to help with LBP .  -Start Celebrex 100 mg PO BID for 1 month  - Start Tizanidine 4 mg PO QHS for 14 days.  - Patient can continue with medications for now since they are providing benefits, using them appropriately, and without side effects.  -Will order EMG/NCS of Rt UE to evaluate weakness in Right shoulder.  - RTC 4 weeks   - Counseled patient regarding the importance of activity modification, smoking cessation, alcohol cessation, constant sleeping habits and physical therapy.    The above plan and management options were discussed at length with patient. Patient is in agreement with the above and verbalized understanding. It will be communicated with the referring physician via  electronic record, fax, or mail.    Coco Bernardo    I have personally reviewed the history and exam of this patient and agree with the resident/fellow/NPs note as stated above.    Kenneth Rincon MD    04/11/2022

## 2022-04-23 ENCOUNTER — HOSPITAL ENCOUNTER (OUTPATIENT)
Dept: RADIOLOGY | Facility: HOSPITAL | Age: 67
Discharge: HOME | End: 2022-04-23
Attending: ANESTHESIOLOGY
Payer: MEDICARE

## 2022-04-23 DIAGNOSIS — M25.511 RIGHT SHOULDER PAIN, UNSPECIFIED CHRONICITY: ICD-10-CM

## 2022-04-23 PROCEDURE — 73221 MRI JOINT UPR EXTREM W/O DYE: CPT | Mod: 26,RT,, | Performed by: RADIOLOGY

## 2022-04-23 PROCEDURE — 73221 MRI JOINT UPR EXTREM W/O DYE: CPT | Mod: TC,RT

## 2022-04-23 PROCEDURE — 73221 MRI SHOULDER WITHOUT CONTRAST RIGHT: ICD-10-PCS | Mod: 26,RT,, | Performed by: RADIOLOGY

## 2022-06-06 ENCOUNTER — OFFICE VISIT (OUTPATIENT)
Dept: SPINE | Facility: CLINIC | Age: 67
End: 2022-06-06
Attending: ANESTHESIOLOGY
Payer: MEDICARE

## 2022-06-06 VITALS
OXYGEN SATURATION: 99 % | HEIGHT: 74 IN | TEMPERATURE: 99 F | HEART RATE: 55 BPM | WEIGHT: 231 LBS | RESPIRATION RATE: 18 BRPM | DIASTOLIC BLOOD PRESSURE: 51 MMHG | BODY MASS INDEX: 29.65 KG/M2 | SYSTOLIC BLOOD PRESSURE: 132 MMHG

## 2022-06-06 DIAGNOSIS — M54.17 LUMBOSACRAL RADICULOPATHY: ICD-10-CM

## 2022-06-06 DIAGNOSIS — M75.121 NONTRAUMATIC COMPLETE TEAR OF RIGHT ROTATOR CUFF: ICD-10-CM

## 2022-06-06 DIAGNOSIS — M51.36 DDD (DEGENERATIVE DISC DISEASE), LUMBAR: Primary | ICD-10-CM

## 2022-06-06 PROCEDURE — 99215 OFFICE O/P EST HI 40 MIN: CPT | Mod: PBBFAC | Performed by: ANESTHESIOLOGY

## 2022-06-06 PROCEDURE — 99999 PR PBB SHADOW E&M-EST. PATIENT-LVL V: CPT | Mod: PBBFAC,,, | Performed by: ANESTHESIOLOGY

## 2022-06-06 PROCEDURE — 99214 PR OFFICE/OUTPT VISIT, EST, LEVL IV, 30-39 MIN: ICD-10-PCS | Mod: S$PBB,GC,, | Performed by: ANESTHESIOLOGY

## 2022-06-06 PROCEDURE — 99214 OFFICE O/P EST MOD 30 MIN: CPT | Mod: S$PBB,GC,, | Performed by: ANESTHESIOLOGY

## 2022-06-06 PROCEDURE — 99999 PR PBB SHADOW E&M-EST. PATIENT-LVL V: ICD-10-PCS | Mod: PBBFAC,,, | Performed by: ANESTHESIOLOGY

## 2022-06-06 NOTE — PROGRESS NOTES
Chronic patient Established Note (Follow up visit)    Referring Physician: No ref. provider found    Chief Complaint:   Chief Complaint   Patient presents with    Shoulder Pain     F/u    Leg Pain        SUBJECTIVE:    Interval history 6/6/2022  Dominguez Hutton presents for follow-up of right shoulder pain and new complaint of right leg pain. Right shoulder pain has remained unchanged with limited ROM. Unable to abduct the shoulder above >60 degrees. Pain remains concentrated over the biceps and shoulder. MRI imaging demonstrated anterior full-thickness rotator cuff tear with intra-articular long head biceps tendinosis and biceps tenosynovitis. He also reports radicular pain from the posterior right thigh to the arch of the foot. It is severe and intermittent. Improves with rest. Similar to pain that was relieved by right L4/5 and L5/S1 TFESI in 2016.    Initial HPI 4/11/2022  Dominguez Hutton presents to the clinic for the evaluation of Right Shoulder  pain. The pain started 5 months  ago following flu shot  and symptoms have been worsening.The pain is located in the Right shoulder  area and radiates to the none.  The pain is described as sharp and is rated as 2/10. The pain is rated with a score of  2/10 on the BEST day and a score of 8/10 on the WORST day.  Symptoms interfere with daily activity, sleeping and glofing. The pain is exacerbated by lifting and  over head movements.  The pain is mitigated by medications and rest.     Patient denies night fever/night sweats, urinary incontinence, bowel incontinence, significant weight loss, significant motor weakness and loss of sensations.    Physical Therapy/Home Exercise: yes  ,FInished PT 2-3 weeks ago.    Pain Disability Index Review:  Last 3 PDI Scores 6/6/2022 7/1/2016 5/16/2016   Pain Disability Index (PDI) 30 21 22       Pain Medications:    Hydrocodone (prescribe by [facility]ctor in [place])  Gabapentin for LBP      report:  Reviewed and consistent  with medication use as prescribed.    Pain Procedures:   2016: L4/5 and L5/S1 TESI    Imaging:   MRI right shoulder 4/23/22  FINDINGS:  Image quality degraded by motion artifact.     Rotator cuff: Full thickness insertional tear involving the extreme anterior fibers of the supraspinatus suspected measuring 13 mm in medial to lateral dimension.  Underlying moderate tendinosis.  The infraspinatus tendon is intact.  There is some focal abnormal signal involving the undersurface of the superior 1/2 of the subscapularis involving approximately 50% of the tendon thickness suggesting partial tearing.  The tendon is otherwise intact.Muscle bulk preserved.     Biceps: There is some complex fluid within the biceps tendon sheath suggesting tenosynovitis.  Flattening and abnormal signal of the intra-articular long head biceps tendon suggesting tendinosis.  There is thickening of the superior glenohumeral ligament.     Labrum: There is tearing of the posterosuperior, posterior and anterior inferior glenoid labrum with a paralabral cyst adjacent to the anterior inferior labrum measuring 5 mm.     Glenohumeral Joint: There is some glenohumeral articular cartilage surface irregularity but no significant joint effusion.  Degenerative cystic changes within the posterior greater tuberosity of the humerus noted.     Acromioclavicular joint: AC joint osteoarthrosis with joint effusion.  Anteriorly downsloping type 1 acromion.     Misc: There is fluid within the subacromial/subdeltoid bursa and there is a large amount of trapped fluid within the subcoracoid bursa.  The coracohumeral interval is narrowed.     Impression:     Constellation of findings suggesting subcoracoid impingement, including subcoracoid bursitis, anterior full-thickness rotator cuff tear, intra-articular long head biceps tendinosis and biceps tenosynovitis.  Partial tear undersurface subscapularis tendon.     Mild subacromial/subdeltoid bursitis    Past Medical  History:   Diagnosis Date    Diabetes mellitus, type 2 4/20/2016    3/4/2016: Fasting Glucose 139.    Disc herniation     Essential hypertension 2/18/2016 2010:  Diagnosed.    History of gout 2/18/2016 1995: Diagnosed. Feet, elbow and knee.     Hypercholesterolemia 2/18/2016 2013: Began statin.    Hyperlipemia     Hypertension     Nonrheumatic aortic insufficiency with aortic stenosis 2/18/2016 9/9/2015: Echo: Normal left ventricular size and systolic function. Moderate LVH. Mild diastolic dysfunction. Mildly dilated LA. Mild aortic valve sclerosis. Mild to moderate AR.    Shortness of breath 6/26/2019 1/2019: Began experience SOB at night.     Past Surgical History:   Procedure Laterality Date    COLONOSCOPY      epidural steroid injection      HERNIA REPAIR       Social History     Socioeconomic History    Marital status:    Tobacco Use    Smoking status: Never Smoker    Smokeless tobacco: Never Used   Substance and Sexual Activity    Alcohol use: Yes     Comment: social    Drug use: No     No family history on file.    Review of patient's allergies indicates:  No Known Allergies    Current Outpatient Medications   Medication Sig    alendronate-vitamin D3 (FOSAMAX PLUS D) 70-2,800 mg-unit per tablet Take 1 tablet by mouth every 7 days.    allopurinol (ZYLOPRIM) 300 MG tablet Take 300 mg by mouth once daily.     amLODIPine (NORVASC) 5 MG tablet Take 1 tablet (5 mg total) by mouth once daily.    aspirin (ECOTRIN) 81 MG EC tablet Take 1 tablet (81 mg total) by mouth once daily.    b complex vitamins tablet Take 1 tablet by mouth once daily.    ergocalciferol (ERGOCALCIFEROL) 50,000 unit Cap Take 50,000 Units by mouth every 7 days.    fish oil-omega-3 fatty acids 300-1,000 mg capsule Take 1 capsule by mouth once daily.     furosemide (LASIX) 20 MG tablet Take 1 tablet (20 mg total) by mouth once daily.    gabapentin (NEURONTIN) 300 MG capsule Take one in the morning, one  "in the afternoon and two at bedtime for 5 days.  Then, two in the morning, one in the afternoon and two at bedtime for 5 days.  Then, two in the morning, two in the afternoon and two at bedtime if tolerated. (Patient taking differently: 600 mg 2 (two) times daily.)    hydrocodone-acetaminophen 10-325mg (NORCO)  mg Tab Take 1 tablet by mouth 3 (three) times daily as needed.     lisinopriL (PRINIVIL,ZESTRIL) 40 MG tablet Take 1 tablet (40 mg total) by mouth once daily.    multivitamin (THERAGRAN) per tablet Take 1 tablet by mouth once daily.    pravastatin (PRAVACHOL) 40 MG tablet Take 1 tablet (40 mg total) by mouth once daily.    tamsulosin (FLOMAX) 0.4 mg Cp24 Take 1 capsule (0.4 mg total) by mouth once daily.     No current facility-administered medications for this visit.       REVIEW OF SYSTEMS:    GENERAL:  No weight loss, malaise or fevers.  HEENT:  Negative for frequent or significant headaches.  NECK:  Negative for lumps, goiter, pain and significant neck swelling.  RESPIRATORY:  Negative for cough, wheezing or shortness of breath.  CARDIOVASCULAR:  Negative for chest pain, leg swelling or palpitations.  GI:  Negative for abdominal discomfort, blood in stools or black stools or change in bowel habits.  MUSCULOSKELETAL:  See HPI.  SKIN:  Negative for lesions, rash, and itching.  PSYCH:  +ve for sleep disturbance, mood disorder and recent psychosocial stressors.  HEMATOLOGY/LYMPHOLOGY:  Negative for prolonged bleeding, bruising easily or swollen nodes.  NEURO:   No history of headaches, syncope, paralysis, seizures or tremors.  All other reviewed and negative other than HPI.    OBJECTIVE:    BP (!) 132/51   Pulse (!) 55   Temp 98.5 °F (36.9 °C)   Resp 18   Ht 6' 2" (1.88 m)   Wt 104.8 kg (231 lb)   SpO2 99%   BMI 29.66 kg/m²     PHYSICAL EXAMINATION:    General appearance: Well appearing, in no acute distress, alert and oriented x3.  Psych:  Mood and affect appropriate.  Skin: Skin color, " texture, turgor normal, no rashes or lesions, in both upper and lower body.  Head/face:  Normocephalic, atraumatic. No palpable lymph nodes.  Neck: No pain to palpation over the cervical paraspinous muscles. Spurling Negative. No pain with neck flexion, extension, or lateral flexion.   Back: Straight leg raising in the sitting and supine positions is +ve to radicular pain. Mild pain to palpation over the spine or costovertebral angles. Normal range of motion without pain reproduction.Positive axial loading test bilateral. -ve FABERE,Ganselin and Yeoman's test on the both side.negative FADIR  Extremities: Peripheral joint ROM is full and pain free without obvious instability or laxity in all four extremities. No deformities, edema, or skin discoloration. Good capillary refill.  Right Shoulder:  Harkins Test + ,Neers test negative, Cross arm test negative. Shoulder abduction and adduction 4/5 .  Elbow and wrist  flexion/extnsion 5/5 , Hand  4/5   Musculoskeletal: Shoulder, hip, sacroiliac and knee provocative maneuvers are negative. lower extremity strength is normal and symmetric.  No atrophy or tone abnormalities are noted.  Neuro: Bilateral upper and lower extremity coordination and muscle stretch reflexes are physiologic and symmetric.  Plantar response are downgoing. No loss of sensation is noted.  Gait: antalgic    ASSESSMENT: 66 y.o. year old male with Right Shoulder and leg pain, consistent with      1. DDD (degenerative disc disease), lumbar  Procedure Order to Pain Management   2. Lumbosacral radiculopathy  Procedure Order to Pain Management   3. Nontraumatic complete tear of right rotator cuff  Ambulatory referral/consult to Orthopedics    Ambulatory referral/consult to Orthopedics       PLAN:     - I have stressed the importance of physical activity and a home exercise plan to help with pain and improve health.  - referral provided to Dr. Dmitri Sibley for shoulder evaluation.   - Patient can  continue with medications for now since they are providing benefits, using them appropriately, and without side effects.  - Schedule for right L4/5 and L5/S1 TFESI  - RTC 4 weeks post-procedure  - Counseled patient regarding the importance of activity modification, smoking cessation, alcohol cessation, constant sleeping habits and physical therapy.    The above plan and management options were discussed at length with patient. Patient is in agreement with the above and verbalized understanding. It will be communicated with the referring physician via electronic record, fax, or mail.    Jorge L Ng I have personally reviewed the history and exam of this patient and agree with the resident/fellow/NPs note as stated above.    Kenneth Rincon MD  6/16/22

## 2022-06-10 ENCOUNTER — HOSPITAL ENCOUNTER (OUTPATIENT)
Dept: RADIOLOGY | Facility: HOSPITAL | Age: 67
Discharge: HOME | End: 2022-06-10
Attending: ORTHOPAEDIC SURGERY
Payer: MEDICARE

## 2022-06-10 ENCOUNTER — OFFICE VISIT (OUTPATIENT)
Dept: SPORTS MEDICINE | Facility: CLINIC | Age: 67
End: 2022-06-10
Payer: MEDICARE

## 2022-06-10 VITALS
BODY MASS INDEX: 29.39 KG/M2 | WEIGHT: 229 LBS | HEART RATE: 68 BPM | SYSTOLIC BLOOD PRESSURE: 119 MMHG | DIASTOLIC BLOOD PRESSURE: 59 MMHG | HEIGHT: 74 IN

## 2022-06-10 DIAGNOSIS — M25.511 RIGHT SHOULDER PAIN, UNSPECIFIED CHRONICITY: ICD-10-CM

## 2022-06-10 DIAGNOSIS — T50.Z95A SHOULDER INJURY RELATED TO VACCINE ADMINISTRATION (SIRVA): Primary | ICD-10-CM

## 2022-06-10 DIAGNOSIS — M75.121 NONTRAUMATIC COMPLETE TEAR OF RIGHT ROTATOR CUFF: ICD-10-CM

## 2022-06-10 DIAGNOSIS — S49.80XA SHOULDER INJURY RELATED TO VACCINE ADMINISTRATION (SIRVA): Primary | ICD-10-CM

## 2022-06-10 PROCEDURE — 20611 DRAIN/INJ JOINT/BURSA W/US: CPT | Mod: PBBFAC,RT | Performed by: ORTHOPAEDIC SURGERY

## 2022-06-10 PROCEDURE — 20611 LARGE JOINT ASPIRATION/INJECTION: R SUBACROMIAL BURSA: ICD-10-PCS | Mod: S$PBB,RT,, | Performed by: ORTHOPAEDIC SURGERY

## 2022-06-10 PROCEDURE — 73030 X-RAY EXAM OF SHOULDER: CPT | Mod: TC,RT

## 2022-06-10 PROCEDURE — 99204 PR OFFICE/OUTPT VISIT, NEW, LEVL IV, 45-59 MIN: ICD-10-PCS | Mod: 25,S$PBB,, | Performed by: ORTHOPAEDIC SURGERY

## 2022-06-10 PROCEDURE — 99999 PR PBB SHADOW E&M-EST. PATIENT-LVL IV: ICD-10-PCS | Mod: PBBFAC,,, | Performed by: ORTHOPAEDIC SURGERY

## 2022-06-10 PROCEDURE — 73030 X-RAY EXAM OF SHOULDER: CPT | Mod: 26,RT,, | Performed by: RADIOLOGY

## 2022-06-10 PROCEDURE — 99204 OFFICE O/P NEW MOD 45 MIN: CPT | Mod: 25,S$PBB,, | Performed by: ORTHOPAEDIC SURGERY

## 2022-06-10 PROCEDURE — 99999 PR PBB SHADOW E&M-EST. PATIENT-LVL IV: CPT | Mod: PBBFAC,,, | Performed by: ORTHOPAEDIC SURGERY

## 2022-06-10 PROCEDURE — 99214 OFFICE O/P EST MOD 30 MIN: CPT | Mod: PBBFAC,25 | Performed by: ORTHOPAEDIC SURGERY

## 2022-06-10 PROCEDURE — 73030 XR SHOULDER COMPLETE 2 OR MORE VIEWS RIGHT: ICD-10-PCS | Mod: 26,RT,, | Performed by: RADIOLOGY

## 2022-06-10 RX ORDER — METHYLPREDNISOLONE ACETATE 40 MG/ML
40 INJECTION, SUSPENSION INTRA-ARTICULAR; INTRALESIONAL; INTRAMUSCULAR; SOFT TISSUE
Status: DISCONTINUED | OUTPATIENT
Start: 2022-06-10 | End: 2022-06-10 | Stop reason: HOSPADM

## 2022-06-10 RX ADMIN — METHYLPREDNISOLONE ACETATE 40 MG: 40 INJECTION, SUSPENSION INTRA-ARTICULAR; INTRALESIONAL; INTRAMUSCULAR; SOFT TISSUE at 10:06

## 2022-06-10 NOTE — PROGRESS NOTES
NEW PATIENT    HISTORY OF PRESENT ILLNESS   66 y.o. Male with a history of right shoulder pain who is referred today by Dr. Kenneth Rincon. He works in the film industry. He states he was in [place] working on a film for [employer] and he was getting his flu shot and he had pain and could not move his arm. 4 months later he was still having pain and could not move his arm. It has been 7 months now and he still cannot lift his arm. He enjoys playing golf and he cannot swing a golf club. He currently lives in [place].     Prior to the injection he had no issues with his right arm.  He states it has significantly gotten worse since injection.  He noticed it immediately the day after the injection.    Is affecting ADLs.  Pain is 4/10 at it's worst.        PAST MEDICAL HISTORY    Past Medical History:   Diagnosis Date    Diabetes mellitus, type 2 4/20/2016    3/4/2016: Fasting Glucose 139.    Disc herniation     Essential hypertension 2/18/2016 2010:  Diagnosed.    History of gout 2/18/2016 1995: Diagnosed. Feet, elbow and knee.     Hypercholesterolemia 2/18/2016 2013: Began statin.    Hyperlipemia     Hypertension     Nonrheumatic aortic insufficiency with aortic stenosis 2/18/2016 9/9/2015: Echo: Normal left ventricular size and systolic function. Moderate LVH. Mild diastolic dysfunction. Mildly dilated LA. Mild aortic valve sclerosis. Mild to moderate AR.    Shortness of breath 6/26/2019 1/2019: Began experience SOB at night.       PAST SURGICAL HISTORY     Past Surgical History:   Procedure Laterality Date    COLONOSCOPY      epidural steroid injection      HERNIA REPAIR         FAMILY HISTORY    No family history on file.    SOCIAL HISTORY    Social History     Socioeconomic History    Marital status:    Tobacco Use    Smoking status: Never Smoker    Smokeless tobacco: Never Used   Substance and Sexual Activity    Alcohol use: Yes     Comment: social    Drug use: No        MEDICATIONS      Current Outpatient Medications:     alendronate-vitamin D3 (FOSAMAX PLUS D) 70-2,800 mg-unit per tablet, Take 1 tablet by mouth every 7 days., Disp: , Rfl:     allopurinol (ZYLOPRIM) 300 MG tablet, Take 300 mg by mouth once daily. , Disp: , Rfl:     amLODIPine (NORVASC) 5 MG tablet, Take 1 tablet (5 mg total) by mouth once daily., Disp: 90 tablet, Rfl: 3    aspirin (ECOTRIN) 81 MG EC tablet, Take 1 tablet (81 mg total) by mouth once daily., Disp: 90 tablet, Rfl: 3    b complex vitamins tablet, Take 1 tablet by mouth once daily., Disp: , Rfl:     ergocalciferol (ERGOCALCIFEROL) 50,000 unit Cap, Take 50,000 Units by mouth every 7 days., Disp: , Rfl:     fish oil-omega-3 fatty acids 300-1,000 mg capsule, Take 1 capsule by mouth once daily. , Disp: , Rfl:     furosemide (LASIX) 20 MG tablet, Take 1 tablet (20 mg total) by mouth once daily., Disp: 90 tablet, Rfl: 3    gabapentin (NEURONTIN) 300 MG capsule, Take one in the morning, one in the afternoon and two at bedtime for 5 days.  Then, two in the morning, one in the afternoon and two at bedtime for 5 days.  Then, two in the morning, two in the afternoon and two at bedtime if tolerated. (Patient taking differently: 600 mg 2 (two) times daily.), Disp: 180 capsule, Rfl: 1    hydrocodone-acetaminophen 10-325mg (NORCO)  mg Tab, Take 1 tablet by mouth 3 (three) times daily as needed. , Disp: , Rfl: 0    lisinopriL (PRINIVIL,ZESTRIL) 40 MG tablet, Take 1 tablet (40 mg total) by mouth once daily., Disp: 90 tablet, Rfl: 3    multivitamin (THERAGRAN) per tablet, Take 1 tablet by mouth once daily., Disp: , Rfl:     pravastatin (PRAVACHOL) 40 MG tablet, Take 1 tablet (40 mg total) by mouth once daily., Disp: 90 tablet, Rfl: 3    tamsulosin (FLOMAX) 0.4 mg Cp24, Take 1 capsule (0.4 mg total) by mouth once daily., Disp: 30 capsule, Rfl: 11    ALLERGIES     Review of patient's allergies indicates:  No Known Allergies      REVIEW OF SYSTEMS  "  Constitution: Negative. Negative for chills, fever and night sweats.   HENT: Negative for congestion and headaches.    Eyes: Negative for blurred vision, left vision loss and right vision loss.   Cardiovascular: Negative for chest pain and syncope.   Respiratory: Negative for cough and shortness of breath.    Endocrine: Negative for polydipsia, polyphagia and polyuria.   Hematologic/Lymphatic: Negative for bleeding problem. Does not bruise/bleed easily.   Skin: Negative for dry skin, itching and rash.   Musculoskeletal: Negative for falls. Positive for right shoulder pain and muscle weakness  Gastrointestinal: Negative for abdominal pain and bowel incontinence.   Genitourinary: Negative for bladder incontinence and nocturia.   Neurological: Negative for disturbances in coordination, loss of balance and seizures.   Psychiatric/Behavioral: Negative for depression. The patient does not have insomnia.    Allergic/Immunologic: Negative for hives and persistent infections.     PHYSICAL EXAMINATION    Vitals: BP (!) 119/59   Pulse 68   Ht 6' 2" (1.88 m)   Wt 103.9 kg (229 lb)   BMI 29.40 kg/m²     General: The patient appears active and healthy with no apparent physical problems.  No disturbance of mood or affect is demonstrated. Alert and Oriented.    HEENT: Eyes normal, pupils equally round, nose normal.    Resp: Equal and symmetrical chest rises. No wheezing    CV: Regular rate    Neck: Supple; nonpainful range of motion.    Extremities: no cyanosis, clubbing, edema, or diffuse swelling.  Palpable pulses, good capillary refill of the digits.  No coolness, discoloration, edema or obvious varicosities.    Skin: no lesions noted.    Lymphatic: no detected adenopathy in the upper or lower extremities.    Neurologic: normal mental status, normal reflexes, normal gait and balance.  Patient is alert and oriented to person, place and time.  No flaccidity or spasticity is noted.  No motor or sensory deficits are noted.  " Light touch is intact    Orthopaedic:     SHOULDER EXAM - RIGHT    Inspection:   Normal skin color and appearance with no scars.  No muscle atrophy noted.  No scapular winging.      Palpation: No tenderness of the acromioclavicular joint, lateral edge of the acromion, biceps tendon, trapezius muscle or scapulothoracic bursa.      ROM:      PROM:     FE - 180°    Abd/ER -  90°  Abd/IR -  45°   Add/ER -  60°     AROM:    FE - 150°    Abd/ER -  90°  Abd/IR -  45°   Add/ER -  60° pain throughout the arc of motion beyond 90°    Tests:     - Harkins, - Neer's, - Cross Arm Adduction, - [place], - [place], - Speed. - Belly Press,  + Jobes, - Lift Off    Stability: - sulcus, - apprehension, - relocation, - load and shift, - DLS      Motor:  Rotator cuff strength is 4/5 supraspinatus, 5/5 infraspinatus, 5/5 subscapularis. Biceps, triceps and deltoid strength is 5/5.      Neuro     Distally there are no paresthesias, and sensation is intact to light touch in the median, ulnar, and radial distributions.  Reflexes are 2/2 biceps, triceps and brachioradialis.        IMAGING    X-ray Shoulder 2 or More Views Right  Narrative: EXAMINATION:  XR SHOULDER COMPLETE 2 OR MORE VIEWS RIGHT    CLINICAL HISTORY:  Complete rotator cuff tear or rupture of right shoulder, not specified as traumatic    TECHNIQUE:  Two or three views of the right shoulder were performed.    COMPARISON:  None    FINDINGS:  Mild DJD.  No fracture or dislocation.  No bone destruction identified .  Impression: See above    Electronically signed by: Murtaza Small MD  Date:    06/10/2022  Time:    10:43        IMPRESSION       ICD-10-CM ICD-9-CM   1. Shoulder injury related to vaccine administration (SIRVA)  S49.80XA 959.2    T50.Z95A E949.9   2. Nontraumatic complete tear of right rotator cuff  M75.121 727.61   3. Right shoulder pain, unspecified chronicity  M25.511 719.41       MEDICATIONS PRESCRIBED      1. None    RECOMMENDATIONS     1. CSI of left shoulder  performed today under ultrasound guidance  2. RTC in 1 month  3. The patient had nearly 100% resolution of symptoms after the injection    Large Joint Aspiration/Injection: R subacromial bursa    Date/Time: 6/10/2022 10:15 AM  Performed by: Daniel Lucero MD  Authorized by: Daniel Lucero MD     Consent Done?:  Yes (Verbal)  Indications:  Pain  Site marked: the procedure site was marked    Timeout: prior to procedure the correct patient, procedure, and site was verified    Prep: patient was prepped and draped in usual sterile fashion      Local anesthesia used?: Yes    Local anesthetic:  Co-phenylcaine spray (0.2% Naropin)  Anesthetic total (ml):  4      Details:  Needle Size:  22 G  Ultrasonic Guidance for needle placement?: Yes (Ultrasound guidance was used for needle localization.  Images were saved and stored for documentation.  Dynamic visualization of the needle was continuous throughout the procedure and maintained accurate placement)    Images are saved and documented.  Approach:  Posterior  Location:  Shoulder  Site:  R subacromial bursa  Medications:  40 mg methylPREDNISolone acetate 40 mg/mL  Medications comment:  5 mL LIDOcaine HCL 10 mg/ml (1%) 10 mg/mL (1 %)  Patient tolerance:  Patient tolerated the procedure well with no immediate complications                  All questions were answered, pt will contact us for questions or concerns in the interim.

## 2022-06-14 ENCOUNTER — PATIENT MESSAGE (OUTPATIENT)
Dept: PAIN MEDICINE | Facility: OTHER | Age: 67
End: 2022-06-14
Payer: MEDICARE

## 2022-06-15 ENCOUNTER — HOSPITAL ENCOUNTER (OUTPATIENT)
Facility: OTHER | Age: 67
Discharge: HOME | End: 2022-06-15
Attending: ANESTHESIOLOGY | Admitting: ANESTHESIOLOGY
Payer: MEDICARE

## 2022-06-15 VITALS
OXYGEN SATURATION: 94 % | HEART RATE: 78 BPM | SYSTOLIC BLOOD PRESSURE: 143 MMHG | HEIGHT: 74 IN | BODY MASS INDEX: 28.88 KG/M2 | WEIGHT: 225 LBS | DIASTOLIC BLOOD PRESSURE: 65 MMHG | RESPIRATION RATE: 16 BRPM

## 2022-06-15 DIAGNOSIS — M54.16 LUMBAR RADICULOPATHY: Primary | ICD-10-CM

## 2022-06-15 DIAGNOSIS — G89.29 CHRONIC PAIN: ICD-10-CM

## 2022-06-15 DIAGNOSIS — M47.816 LUMBAR SPONDYLOSIS: ICD-10-CM

## 2022-06-15 LAB — POCT GLUCOSE: 79 MG/DL (ref 70–110)

## 2022-06-15 PROCEDURE — 64483 NJX AA&/STRD TFRM EPI L/S 1: CPT | Mod: RT,,, | Performed by: ANESTHESIOLOGY

## 2022-06-15 PROCEDURE — 63600175 PHARM REV CODE 636 W HCPCS: Performed by: ANESTHESIOLOGY

## 2022-06-15 PROCEDURE — 82947 ASSAY GLUCOSE BLOOD QUANT: CPT | Performed by: ANESTHESIOLOGY

## 2022-06-15 PROCEDURE — 64483 PR EPIDURAL INJ, ANES/STEROID, TRANSFORAMINAL, LUMB/SACR, SNGL LEVL: ICD-10-PCS | Mod: RT,,, | Performed by: ANESTHESIOLOGY

## 2022-06-15 PROCEDURE — 99152 MOD SED SAME PHYS/QHP 5/>YRS: CPT | Performed by: ANESTHESIOLOGY

## 2022-06-15 PROCEDURE — 64484 NJX AA&/STRD TFRM EPI L/S EA: CPT | Mod: RT | Performed by: ANESTHESIOLOGY

## 2022-06-15 PROCEDURE — 64483 NJX AA&/STRD TFRM EPI L/S 1: CPT | Mod: RT | Performed by: ANESTHESIOLOGY

## 2022-06-15 PROCEDURE — 64484 NJX AA&/STRD TFRM EPI L/S EA: CPT | Mod: RT,,, | Performed by: ANESTHESIOLOGY

## 2022-06-15 PROCEDURE — 25000003 PHARM REV CODE 250: Performed by: ANESTHESIOLOGY

## 2022-06-15 PROCEDURE — 99152 MOD SED SAME PHYS/QHP 5/>YRS: CPT | Mod: ,,, | Performed by: ANESTHESIOLOGY

## 2022-06-15 PROCEDURE — 64484 PRA INJECT ANES/STEROID FORAMEN LUMBAR/SACRAL W IMG GUIDE ,EA ADD LEVEL: ICD-10-PCS | Mod: RT,,, | Performed by: ANESTHESIOLOGY

## 2022-06-15 PROCEDURE — 25500020 PHARM REV CODE 255: Performed by: ANESTHESIOLOGY

## 2022-06-15 PROCEDURE — 99152 PR MOD CONSCIOUS SEDATION, SAME PHYS, 5+ YRS, FIRST 15 MIN: ICD-10-PCS | Mod: ,,, | Performed by: ANESTHESIOLOGY

## 2022-06-15 RX ORDER — MIDAZOLAM HYDROCHLORIDE 1 MG/ML
INJECTION INTRAMUSCULAR; INTRAVENOUS
Status: DISCONTINUED | OUTPATIENT
Start: 2022-06-15 | End: 2022-06-15 | Stop reason: HOSPADM

## 2022-06-15 RX ORDER — LIDOCAINE HYDROCHLORIDE 10 MG/ML
INJECTION, SOLUTION EPIDURAL; INFILTRATION; INTRACAUDAL; PERINEURAL
Status: DISCONTINUED | OUTPATIENT
Start: 2022-06-15 | End: 2022-06-15 | Stop reason: HOSPADM

## 2022-06-15 RX ORDER — LIDOCAINE HYDROCHLORIDE 20 MG/ML
INJECTION, SOLUTION INFILTRATION; PERINEURAL
Status: DISCONTINUED | OUTPATIENT
Start: 2022-06-15 | End: 2022-06-15 | Stop reason: HOSPADM

## 2022-06-15 RX ORDER — DEXAMETHASONE SODIUM PHOSPHATE 10 MG/ML
INJECTION INTRAMUSCULAR; INTRAVENOUS
Status: DISCONTINUED | OUTPATIENT
Start: 2022-06-15 | End: 2022-06-15 | Stop reason: HOSPADM

## 2022-06-15 RX ORDER — FENTANYL CITRATE 50 UG/ML
INJECTION, SOLUTION INTRAMUSCULAR; INTRAVENOUS
Status: DISCONTINUED | OUTPATIENT
Start: 2022-06-15 | End: 2022-06-15 | Stop reason: HOSPADM

## 2022-06-15 RX ORDER — SODIUM CHLORIDE 9 MG/ML
500 INJECTION, SOLUTION INTRAVENOUS CONTINUOUS
Status: DISCONTINUED | OUTPATIENT
Start: 2022-06-15 | End: 2022-06-15 | Stop reason: HOSPADM

## 2022-06-15 NOTE — DISCHARGE INSTRUCTIONS

## 2022-06-15 NOTE — DISCHARGE SUMMARY
Discharge Note  Short Stay      SUMMARY     Admit Date: 6/15/2022    Attending Physician: Coco Bernardo      Discharge Physician: Coco Bernardo      Discharge Date: 6/15/2022 12:14 PM    Procedure(s) (LRB):  INJECTION, STEROID, EPIDURAL, TRANSFORAMINAL  RIGHT L4/5 and L5/S1 CONTRAST (Right)    Final Diagnosis: DDD (degenerative disc disease), lumbar [M51.36]  Lumbosacral radiculopathy [M54.17]    Disposition: Home or self care    Patient Instructions:   Current Discharge Medication List      CONTINUE these medications which have NOT CHANGED    Details   alendronate-vitamin D3 (FOSAMAX PLUS D) 70-2,800 mg-unit per tablet Take 1 tablet by mouth every 7 days.      allopurinol (ZYLOPRIM) 300 MG tablet Take 300 mg by mouth once daily.       amLODIPine (NORVASC) 5 MG tablet Take 1 tablet (5 mg total) by mouth once daily.  Qty: 90 tablet, Refills: 3    Associated Diagnoses: Essential hypertension      aspirin (ECOTRIN) 81 MG EC tablet Take 1 tablet (81 mg total) by mouth once daily.  Qty: 90 tablet, Refills: 3    Associated Diagnoses: Type 2 diabetes mellitus without complication, without long-term current use of insulin      b complex vitamins tablet Take 1 tablet by mouth once daily.      ergocalciferol (ERGOCALCIFEROL) 50,000 unit Cap Take 50,000 Units by mouth every 7 days.      fish oil-omega-3 fatty acids 300-1,000 mg capsule Take 1 capsule by mouth once daily.       furosemide (LASIX) 20 MG tablet Take 1 tablet (20 mg total) by mouth once daily.  Qty: 90 tablet, Refills: 3    Associated Diagnoses: Essential hypertension      gabapentin (NEURONTIN) 300 MG capsule Take one in the morning, one in the afternoon and two at bedtime for 5 days.  Then, two in the morning, one in the afternoon and two at bedtime for 5 days.  Then, two in the morning, two in the afternoon and two at bedtime if tolerated.  Qty: 180 capsule, Refills: 1    Associated Diagnoses: Right lumbar radiculopathy; Osteoarthritis of lumbar spine,  unspecified spinal osteoarthritis; Lumbar facet arthropathy      hydrocodone-acetaminophen 10-325mg (NORCO)  mg Tab Take 1 tablet by mouth 3 (three) times daily as needed.   Refills: 0      lisinopriL (PRINIVIL,ZESTRIL) 40 MG tablet Take 1 tablet (40 mg total) by mouth once daily.  Qty: 90 tablet, Refills: 3    Associated Diagnoses: Essential hypertension      multivitamin (THERAGRAN) per tablet Take 1 tablet by mouth once daily.      pravastatin (PRAVACHOL) 40 MG tablet Take 1 tablet (40 mg total) by mouth once daily.  Qty: 90 tablet, Refills: 3    Associated Diagnoses: Hypercholesterolemia      tamsulosin (FLOMAX) 0.4 mg Cp24 Take 1 capsule (0.4 mg total) by mouth once daily.  Qty: 30 capsule, Refills: 11                 Discharge Diagnosis: DDD (degenerative disc disease), lumbar [M51.36]  Lumbosacral radiculopathy [M54.17]  Condition on Discharge: Stable with no complications to procedure   Diet on Discharge: Same as before.  Activity: as per instruction sheet.  Discharge to: Home with a responsible adult.  Follow up: 2-4 weeks       Please call my office or pager at [phone] if experienced any weakness or loss of sensation, fever > 101.5, pain uncontrolled with oral medications, persistent nausea/vomiting/or diarrhea, redness or drainage from the incisions, or any other worrisome concerns. If physician on call was not reached or could not communicate with our office for any reason please go to the nearest emergency department

## 2022-06-15 NOTE — OP NOTE
Lumbar Transforaminal Epidural Steroid Injection under Fluoroscopic Guidance    The procedure, risks, benefits, and options were discussed with the patient. There are no contraindications to the procedure. The patent expressed understanding and agreed to the procedure. Informed written consent was obtained prior to the start of the procedure and can be found in the patient's chart.    PATIENT NAME: Dominguez Hutton   MRN: [id number]     DATE OF PROCEDURE: 06/15/2022    PROCEDURE:  Right  L4/5 and L5/S1 Lumbar Transforaminal Epidural Steroid Injection under Fluoroscopic Guidance    PRE-OP DIAGNOSIS: DDD (degenerative disc disease), lumbar [M51.36]  Lumbosacral radiculopathy [M54.17] Lumbar radiculopathy [M54.16]    POST-OP DIAGNOSIS: Same    PHYSICIAN: Kenneth Rincon MD    ASSISTANTS: Muddassir Sana, M.D. [facility] Fellow     MEDICATIONS INJECTED: Preservative-free Decadron 10mg with 5cc of Lidocaine 1% MPF     LOCAL ANESTHETIC INJECTED: Xylocaine 2%     SEDATION:   Versed 2mg and Fentanyl 50mcg                                                                                                                                                                                     Conscious sedation ordered by M.D. Patient re-evaluation prior to administration of conscious sedation. No changes noted in patient's status from initial evaluation. The patient's vital signs were monitored by RN and patient remained hemodynamically stable throughout the procedure.    Event Time In   Sedation Start 1203   Sedation End 1211       ESTIMATED BLOOD LOSS: None    COMPLICATIONS: None    TECHNIQUE: Time-out was performed to identify the patient and procedure to be performed. With the patient laying in a prone position, the surgical area was prepped and draped in the usual sterile fashion using ChloraPrep and a fenestrated drape.The levels were determined under fluoroscopy guidance. Skin anesthesia was achieved by injecting Lidocaine 2%  over the injection sites. The transforaminal spaces were then approached with a 22 gauge, 5 inch spinal quinke needle that was introduced under fluoroscopic guidance in the AP and Lateral views. Once the needle tip was in the area of the transforaminal space, and there was no blood, CSF or paraesthesias, contrast dye Omnipaque (240mg/mL) was injected to confirm placement and there was no vascular runoff. Fluoroscopic imaging in the AP and lateral views revealed a clear outline of the spinal nerve with proximal spread of agent through the neural foramen into the epidural space. 3 mL of the medication mixture listed above was injected slowly at each site. Displacement of the radio opaque contrast after injection of the medication confirmed that the medication went into the area of the transforaminal spaces. The needles were removed and bleeding was nil. A sterile dressing was applied. No specimens collected. The patient tolerated the procedure well.       The patient was monitored after the procedure in the recovery area. They were given post-procedure and discharge instructions to follow at home. The patient was discharged in a stable condition.    I reviewed and edited the fellow's note. I conducted my own interview and physical examination. I agree with the findings. I was present and supervising all critical portions of the procedure.    Kenneth Rincon MD

## 2022-06-15 NOTE — H&P
HPI  Patient presenting for Procedure(s) (LRB):  INJECTION, STEROID, EPIDURAL, TRANSFORAMINAL  RIGHT L4/5 and L5/S1 CONTRAST (Right)     Patient on Anti-coagulation No    No health changes since previous encounter    Past Medical History:   Diagnosis Date    Diabetes mellitus, type 2 4/20/2016    3/4/2016: Fasting Glucose 139.    Disc herniation     Essential hypertension 2/18/2016 2010:  Diagnosed.    History of gout 2/18/2016 1995: Diagnosed. Feet, elbow and knee.     Hypercholesterolemia 2/18/2016 2013: Began statin.    Hyperlipemia     Hypertension     Nonrheumatic aortic insufficiency with aortic stenosis 2/18/2016 9/9/2015: Echo: Normal left ventricular size and systolic function. Moderate LVH. Mild diastolic dysfunction. Mildly dilated LA. Mild aortic valve sclerosis. Mild to moderate AR.    Shortness of breath 6/26/2019 1/2019: Began experience SOB at night.     Past Surgical History:   Procedure Laterality Date    COLONOSCOPY      epidural steroid injection      HERNIA REPAIR       Review of patient's allergies indicates:  No Known Allergies   Current Facility-Administered Medications   Medication    0.9%  NaCl infusion     Current Outpatient Medications   Medication Sig    alendronate-vitamin D3 (FOSAMAX PLUS D) 70-2,800 mg-unit per tablet Take 1 tablet by mouth every 7 days.    allopurinol (ZYLOPRIM) 300 MG tablet Take 300 mg by mouth once daily.     amLODIPine (NORVASC) 5 MG tablet Take 1 tablet (5 mg total) by mouth once daily.    aspirin (ECOTRIN) 81 MG EC tablet Take 1 tablet (81 mg total) by mouth once daily.    b complex vitamins tablet Take 1 tablet by mouth once daily.    ergocalciferol (ERGOCALCIFEROL) 50,000 unit Cap Take 50,000 Units by mouth every 7 days.    fish oil-omega-3 fatty acids 300-1,000 mg capsule Take 1 capsule by mouth once daily.     furosemide (LASIX) 20 MG tablet Take 1 tablet (20 mg total) by mouth once daily.    gabapentin (NEURONTIN) 300  "MG capsule Take one in the morning, one in the afternoon and two at bedtime for 5 days.  Then, two in the morning, one in the afternoon and two at bedtime for 5 days.  Then, two in the morning, two in the afternoon and two at bedtime if tolerated. (Patient taking differently: 600 mg 2 (two) times daily.)    hydrocodone-acetaminophen 10-325mg (NORCO)  mg Tab Take 1 tablet by mouth 3 (three) times daily as needed.     lisinopriL (PRINIVIL,ZESTRIL) 40 MG tablet Take 1 tablet (40 mg total) by mouth once daily.    multivitamin (THERAGRAN) per tablet Take 1 tablet by mouth once daily.    pravastatin (PRAVACHOL) 40 MG tablet Take 1 tablet (40 mg total) by mouth once daily.    tamsulosin (FLOMAX) 0.4 mg Cp24 Take 1 capsule (0.4 mg total) by mouth once daily.       PMHx, PSHx, Allergies, Medications reviewed in epic    ROS negative except pain complaints in HPI    OBJECTIVE:    BP (!) 143/65   Pulse 78   Resp 16   Ht 6' 2" (1.88 m)   Wt 102.1 kg (225 lb)   SpO2 (!) 94%   BMI 28.89 kg/m²     PHYSICAL EXAMINATION:    GENERAL: Well appearing, in no acute distress, alert and oriented x3.  PSYCH:  Mood and affect appropriate.  SKIN: Skin color, texture, turgor normal, no rashes or lesions which will impact the procedure.  CV: RRR with palpation of the radial artery.  PULM: No evidence of respiratory difficulty, symmetric chest rise. Clear to auscultation.  NEURO: Cranial nerves grossly intact.    Plan:    Proceed with procedure as planned Procedure(s) (LRB):  INJECTION, STEROID, EPIDURAL, TRANSFORAMINAL  RIGHT L4/5 and L5/S1 CONTRAST (Right)    Coco Bernardo  06/15/2022          "

## 2022-07-11 ENCOUNTER — OFFICE VISIT (OUTPATIENT)
Dept: SPORTS MEDICINE | Facility: CLINIC | Age: 67
End: 2022-07-11
Payer: MEDICARE

## 2022-07-11 ENCOUNTER — PATIENT MESSAGE (OUTPATIENT)
Dept: SPORTS MEDICINE | Facility: CLINIC | Age: 67
End: 2022-07-11

## 2022-07-11 VITALS
BODY MASS INDEX: 28.88 KG/M2 | DIASTOLIC BLOOD PRESSURE: 58 MMHG | WEIGHT: 225 LBS | HEIGHT: 74 IN | HEART RATE: 55 BPM | SYSTOLIC BLOOD PRESSURE: 146 MMHG

## 2022-07-11 DIAGNOSIS — M75.121 NONTRAUMATIC COMPLETE TEAR OF RIGHT ROTATOR CUFF: ICD-10-CM

## 2022-07-11 DIAGNOSIS — S49.80XA SHOULDER INJURY RELATED TO VACCINE ADMINISTRATION (SIRVA): Primary | ICD-10-CM

## 2022-07-11 DIAGNOSIS — T50.Z95A SHOULDER INJURY RELATED TO VACCINE ADMINISTRATION (SIRVA): Primary | ICD-10-CM

## 2022-07-11 DIAGNOSIS — M54.16 LUMBAR RADICULOPATHY: ICD-10-CM

## 2022-07-11 PROCEDURE — 99213 OFFICE O/P EST LOW 20 MIN: CPT | Mod: S$PBB,,, | Performed by: ORTHOPAEDIC SURGERY

## 2022-07-11 PROCEDURE — 99214 OFFICE O/P EST MOD 30 MIN: CPT | Mod: PBBFAC | Performed by: ORTHOPAEDIC SURGERY

## 2022-07-11 PROCEDURE — 99999 PR PBB SHADOW E&M-EST. PATIENT-LVL IV: ICD-10-PCS | Mod: PBBFAC,,, | Performed by: ORTHOPAEDIC SURGERY

## 2022-07-11 PROCEDURE — 99999 PR PBB SHADOW E&M-EST. PATIENT-LVL IV: CPT | Mod: PBBFAC,,, | Performed by: ORTHOPAEDIC SURGERY

## 2022-07-11 PROCEDURE — 99213 PR OFFICE/OUTPT VISIT, EST, LEVL III, 20-29 MIN: ICD-10-PCS | Mod: S$PBB,,, | Performed by: ORTHOPAEDIC SURGERY

## 2022-07-11 NOTE — PROGRESS NOTES
ESTABLISHED PATIENT    History 7/11/2022:  John returns to clinic today for follow up of right shoulder pain. He was given a CSI at last visit. He reports significant relief. He reports less pain and increased range of motion.  He is very happy with the injection and states he has regained his full range of motion.    History 6/10/2022:   66 y.o. Male with a history of right shoulder pain who is referred today by Dr. Kenneth Rincon. He works in the film industry. He states he was in [place] working on a film for [employer] and he was getting his flu shot and he had pain and could not move his arm. 4 months later he was still having pain and could not move his arm. It has been 7 months now and he still cannot lift his arm. He enjoys playing golf and he cannot swing a golf club. He currently lives in [place].     Prior to the injection he had no issues with his right arm.  He states it has significantly gotten worse since injection.  He noticed it immediately the day after the injection.    Is affecting ADLs.  Pain is 2/10 at it's worst.    REVIEW OF SYSTEMS   Constitution: Negative. Negative for chills, fever and night sweats.   HENT: Negative for congestion and headaches.    Eyes: Negative for blurred vision, left vision loss and right vision loss.   Cardiovascular: Negative for chest pain and syncope.   Respiratory: Negative for cough and shortness of breath.    Endocrine: Negative for polydipsia, polyphagia and polyuria.   Hematologic/Lymphatic: Negative for bleeding problem. Does not bruise/bleed easily.   Skin: Negative for dry skin, itching and rash.   Musculoskeletal: Negative for falls. Positive for right shoulder pain and muscle weakness  Gastrointestinal: Negative for abdominal pain and bowel incontinence.   Genitourinary: Negative for bladder incontinence and nocturia.   Neurological: Negative for disturbances in coordination, loss of balance and seizures.   Psychiatric/Behavioral: Negative for depression.  "The patient does not have insomnia.    Allergic/Immunologic: Negative for hives and persistent infections.     PHYSICAL EXAMINATION    Vitals: BP (!) 146/58   Pulse (!) 55   Ht 6' 2" (1.88 m)   Wt 102.1 kg (225 lb)   BMI 28.89 kg/m²     General: The patient appears active and healthy with no apparent physical problems.  No disturbance of mood or affect is demonstrated. Alert and Oriented.    HEENT: Eyes normal, pupils equally round, nose normal.    Resp: Equal and symmetrical chest rises. No wheezing    CV: Regular rate    Neck: Supple; nonpainful range of motion.    Extremities: no cyanosis, clubbing, edema, or diffuse swelling.  Palpable pulses, good capillary refill of the digits.  No coolness, discoloration, edema or obvious varicosities.    Skin: no lesions noted.    Lymphatic: no detected adenopathy in the upper or lower extremities.    Neurologic: normal mental status, normal reflexes, normal gait and balance.  Patient is alert and oriented to person, place and time.  No flaccidity or spasticity is noted.  No motor or sensory deficits are noted.  Light touch is intact    Orthopaedic:     SHOULDER EXAM - RIGHT    Inspection:   Normal skin color and appearance with no scars.  No muscle atrophy noted.  No scapular winging.      Palpation: No tenderness of the acromioclavicular joint, lateral edge of the acromion, biceps tendon, trapezius muscle or scapulothoracic bursa.      ROM:      PROM:     FE - 180°    Abd/ER -  90°  Abd/IR -  45°   Add/ER -  60°     AROM:    FE - 170°    Abd/ER -  90°  Abd/IR -  45°   Add/ER -  60°     Tests:     - Harkins, - Neer's, - Cross Arm Adduction, - [place], - Yerguson, - Speed. - Belly Press,  - Jobes, - Lift Off    Stability: - sulcus, - apprehension, - relocation, - load and shift, - DLS      Motor:  Rotator cuff strength is 5/5 supraspinatus, 5/5 infraspinatus, 5/5 subscapularis. Biceps, triceps and deltoid strength is 5/5.      Neuro     Distally there are no " paresthesias, and sensation is intact to light touch in the median, ulnar, and radial distributions.  Reflexes are 2/2 biceps, triceps and brachioradialis.        IMAGING    None today    IMPRESSION       ICD-10-CM ICD-9-CM   1. Shoulder injury related to vaccine administration (SIRVA)  S49.80XA 959.2    T50.Z95A E949.9   2. Nontraumatic complete tear of right rotator cuff  M75.121 727.61       MEDICATIONS PRESCRIBED      1. None    RECOMMENDATIONS     1. RTC PRN  2. Referral to Dr. Chacon today for a 2nd opinion regarding his history of sciatica.  Upon discussion today, it sounds like he is having radicular symptoms extending in the L5/S1 distribution.  He has had a previous history of multiple epidural injections      All questions were answered, pt will contact us for questions or concerns in the interim.

## 2022-09-28 ENCOUNTER — TELEPHONE (OUTPATIENT)
Dept: SPORTS MEDICINE | Facility: CLINIC | Age: 67
End: 2022-09-28
Payer: MEDICARE

## 2022-09-28 NOTE — TELEPHONE ENCOUNTER
[facility] benny Shell stating Dr. Lucero did not see the patient on 11/1/2021 or 11/30/2021. Provided the phone number for medical records: [phone] should she need more information.    Nilsa Almoodvar MS, OTC [facility]    ----- Message from Janelle Banks sent at 9/28/2022  4:14 PM CDT -----  Brice xiao   calling to request pt records , for billing 11-1-21- 11-30-21 , also medical certifacation     Confirmed patient's contact info below:  Contact Name: Anastasiia  Phone Number: [phone] fax [phone]

## 2022-12-21 ENCOUNTER — TELEPHONE (OUTPATIENT)
Dept: SPORTS MEDICINE | Facility: CLINIC | Age: 67
End: 2022-12-21
Payer: MEDICARE

## 2022-12-21 NOTE — TELEPHONE ENCOUNTER
Spoke with patient regarding medical records. Offered patient the number and call back number for any questions.

## 2023-02-22 ENCOUNTER — TELEPHONE (OUTPATIENT)
Dept: SPORTS MEDICINE | Facility: CLINIC | Age: 68
End: 2023-02-22
Payer: MEDICARE

## 2023-02-22 NOTE — TELEPHONE ENCOUNTER
Spoke with patient and provided number to medical records. [phone], fax number [phone]    Nilsa Almodovar MS, OTC [facility]    ----- Message from Mara Hutchinson sent at 2/22/2023  8:47 AM CST -----  Regarding: medical records  Contact: [phone]  Dominguez Wilson calling regarding for #questions for medical records for court. Plz call back

## 2024-09-25 ENCOUNTER — HOSPITAL ENCOUNTER (INPATIENT)
Facility: MEDICAL CENTER | Age: 69
LOS: 2 days | End: 2024-09-27
Attending: EMERGENCY MEDICINE | Admitting: INTERNAL MEDICINE
Payer: MEDICARE

## 2024-09-25 ENCOUNTER — OFFICE VISIT (OUTPATIENT)
Dept: URGENT CARE | Facility: PHYSICIAN GROUP | Age: 69
End: 2024-09-25
Payer: MEDICARE

## 2024-09-25 ENCOUNTER — APPOINTMENT (OUTPATIENT)
Dept: RADIOLOGY | Facility: MEDICAL CENTER | Age: 69
End: 2024-09-25
Attending: EMERGENCY MEDICINE
Payer: MEDICARE

## 2024-09-25 ENCOUNTER — APPOINTMENT (OUTPATIENT)
Dept: RADIOLOGY | Facility: IMAGING CENTER | Age: 69
End: 2024-09-25
Attending: STUDENT IN AN ORGANIZED HEALTH CARE EDUCATION/TRAINING PROGRAM
Payer: MEDICARE

## 2024-09-25 VITALS
WEIGHT: 233 LBS | DIASTOLIC BLOOD PRESSURE: 66 MMHG | RESPIRATION RATE: 12 BRPM | HEART RATE: 55 BPM | BODY MASS INDEX: 29.9 KG/M2 | TEMPERATURE: 98.8 F | SYSTOLIC BLOOD PRESSURE: 154 MMHG | OXYGEN SATURATION: 94 % | HEIGHT: 74 IN

## 2024-09-25 DIAGNOSIS — R06.00 DYSPNEA, UNSPECIFIED TYPE: ICD-10-CM

## 2024-09-25 DIAGNOSIS — R06.01 ORTHOPNEA: ICD-10-CM

## 2024-09-25 DIAGNOSIS — R79.89 ELEVATED TROPONIN: ICD-10-CM

## 2024-09-25 DIAGNOSIS — N40.1 BENIGN PROSTATIC HYPERPLASIA (BPH) WITH STRAINING ON URINATION: ICD-10-CM

## 2024-09-25 DIAGNOSIS — R06.89 RESPIRATORY INSUFFICIENCY: ICD-10-CM

## 2024-09-25 DIAGNOSIS — I50.31 ACUTE DIASTOLIC HEART FAILURE (HCC): ICD-10-CM

## 2024-09-25 DIAGNOSIS — I50.9 ACUTE CONGESTIVE HEART FAILURE, UNSPECIFIED HEART FAILURE TYPE (HCC): ICD-10-CM

## 2024-09-25 DIAGNOSIS — I16.0 HYPERTENSIVE URGENCY: ICD-10-CM

## 2024-09-25 DIAGNOSIS — R06.02 SHORTNESS OF BREATH: ICD-10-CM

## 2024-09-25 DIAGNOSIS — R39.16 BENIGN PROSTATIC HYPERPLASIA (BPH) WITH STRAINING ON URINATION: ICD-10-CM

## 2024-09-25 PROBLEM — I35.1 AORTIC REGURGITATION: Status: ACTIVE | Noted: 2024-09-25

## 2024-09-25 PROBLEM — M10.9 GOUT: Status: ACTIVE | Noted: 2024-09-25

## 2024-09-25 PROBLEM — I10 HYPERTENSION: Status: ACTIVE | Noted: 2024-09-25

## 2024-09-25 PROBLEM — G47.30 SLEEP APNEA: Status: ACTIVE | Noted: 2024-09-25

## 2024-09-25 LAB
ALBUMIN SERPL BCP-MCNC: 4.5 G/DL (ref 3.2–4.9)
ALBUMIN/GLOB SERPL: 1.7 G/DL
ALP SERPL-CCNC: 73 U/L (ref 30–99)
ALT SERPL-CCNC: 18 U/L (ref 2–50)
ANION GAP SERPL CALC-SCNC: 13 MMOL/L (ref 7–16)
AST SERPL-CCNC: 26 U/L (ref 12–45)
BASOPHILS # BLD AUTO: 0.8 % (ref 0–1.8)
BASOPHILS # BLD: 0.07 K/UL (ref 0–0.12)
BILIRUB SERPL-MCNC: 0.8 MG/DL (ref 0.1–1.5)
BUN SERPL-MCNC: 12 MG/DL (ref 8–22)
CALCIUM ALBUM COR SERPL-MCNC: 9.9 MG/DL (ref 8.5–10.5)
CALCIUM SERPL-MCNC: 10.3 MG/DL (ref 8.5–10.5)
CHLORIDE SERPL-SCNC: 101 MMOL/L (ref 96–112)
CO2 SERPL-SCNC: 23 MMOL/L (ref 20–33)
CREAT SERPL-MCNC: 0.85 MG/DL (ref 0.5–1.4)
EKG IMPRESSION: NORMAL
EOSINOPHIL # BLD AUTO: 0.14 K/UL (ref 0–0.51)
EOSINOPHIL NFR BLD: 1.6 % (ref 0–6.9)
ERYTHROCYTE [DISTWIDTH] IN BLOOD BY AUTOMATED COUNT: 43.6 FL (ref 35.9–50)
GFR SERPLBLD CREATININE-BSD FMLA CKD-EPI: 93 ML/MIN/1.73 M 2
GLOBULIN SER CALC-MCNC: 2.7 G/DL (ref 1.9–3.5)
GLUCOSE SERPL-MCNC: 101 MG/DL (ref 65–99)
HCT VFR BLD AUTO: 45.9 % (ref 42–52)
HGB BLD-MCNC: 15.9 G/DL (ref 14–18)
IMM GRANULOCYTES # BLD AUTO: 0.02 K/UL (ref 0–0.11)
IMM GRANULOCYTES NFR BLD AUTO: 0.2 % (ref 0–0.9)
LYMPHOCYTES # BLD AUTO: 1.77 K/UL (ref 1–4.8)
LYMPHOCYTES NFR BLD: 19.8 % (ref 22–41)
MCH RBC QN AUTO: 31.1 PG (ref 27–33)
MCHC RBC AUTO-ENTMCNC: 34.6 G/DL (ref 32.3–36.5)
MCV RBC AUTO: 89.6 FL (ref 81.4–97.8)
MONOCYTES # BLD AUTO: 0.82 K/UL (ref 0–0.85)
MONOCYTES NFR BLD AUTO: 9.2 % (ref 0–13.4)
NEUTROPHILS # BLD AUTO: 6.12 K/UL (ref 1.82–7.42)
NEUTROPHILS NFR BLD: 68.4 % (ref 44–72)
NRBC # BLD AUTO: 0 K/UL
NRBC BLD-RTO: 0 /100 WBC (ref 0–0.2)
NT-PROBNP SERPL IA-MCNC: 2597 PG/ML (ref 0–125)
PLATELET # BLD AUTO: 182 K/UL (ref 164–446)
PMV BLD AUTO: 9.4 FL (ref 9–12.9)
POTASSIUM SERPL-SCNC: 3.9 MMOL/L (ref 3.6–5.5)
PROT SERPL-MCNC: 7.2 G/DL (ref 6–8.2)
RBC # BLD AUTO: 5.12 M/UL (ref 4.7–6.1)
SODIUM SERPL-SCNC: 137 MMOL/L (ref 135–145)
TROPONIN T SERPL-MCNC: 40 NG/L (ref 6–19)
TSH SERPL-ACNC: 7.87 UIU/ML (ref 0.35–5.5)
WBC # BLD AUTO: 8.9 K/UL (ref 4.8–10.8)

## 2024-09-25 PROCEDURE — 80053 COMPREHEN METABOLIC PANEL: CPT

## 2024-09-25 PROCEDURE — 700111 HCHG RX REV CODE 636 W/ 250 OVERRIDE (IP): Mod: JZ | Performed by: EMERGENCY MEDICINE

## 2024-09-25 PROCEDURE — A9270 NON-COVERED ITEM OR SERVICE: HCPCS | Performed by: EMERGENCY MEDICINE

## 2024-09-25 PROCEDURE — 700102 HCHG RX REV CODE 250 W/ 637 OVERRIDE(OP): Performed by: INTERNAL MEDICINE

## 2024-09-25 PROCEDURE — 83880 ASSAY OF NATRIURETIC PEPTIDE: CPT

## 2024-09-25 PROCEDURE — 71045 X-RAY EXAM CHEST 1 VIEW: CPT

## 2024-09-25 PROCEDURE — 770020 HCHG ROOM/CARE - TELE (206)

## 2024-09-25 PROCEDURE — A9270 NON-COVERED ITEM OR SERVICE: HCPCS | Performed by: INTERNAL MEDICINE

## 2024-09-25 PROCEDURE — 71046 X-RAY EXAM CHEST 2 VIEWS: CPT | Mod: TC | Performed by: PHYSICIAN ASSISTANT

## 2024-09-25 PROCEDURE — 99223 1ST HOSP IP/OBS HIGH 75: CPT | Mod: AI | Performed by: INTERNAL MEDICINE

## 2024-09-25 PROCEDURE — 700102 HCHG RX REV CODE 250 W/ 637 OVERRIDE(OP): Performed by: EMERGENCY MEDICINE

## 2024-09-25 PROCEDURE — 96374 THER/PROPH/DIAG INJ IV PUSH: CPT

## 2024-09-25 PROCEDURE — 93005 ELECTROCARDIOGRAM TRACING: CPT | Performed by: EMERGENCY MEDICINE

## 2024-09-25 PROCEDURE — 99285 EMERGENCY DEPT VISIT HI MDM: CPT

## 2024-09-25 PROCEDURE — 84443 ASSAY THYROID STIM HORMONE: CPT

## 2024-09-25 PROCEDURE — 84484 ASSAY OF TROPONIN QUANT: CPT

## 2024-09-25 PROCEDURE — 36415 COLL VENOUS BLD VENIPUNCTURE: CPT

## 2024-09-25 PROCEDURE — 93005 ELECTROCARDIOGRAM TRACING: CPT

## 2024-09-25 PROCEDURE — 96375 TX/PRO/DX INJ NEW DRUG ADDON: CPT

## 2024-09-25 PROCEDURE — 700111 HCHG RX REV CODE 636 W/ 250 OVERRIDE (IP): Mod: JZ | Performed by: INTERNAL MEDICINE

## 2024-09-25 PROCEDURE — 85025 COMPLETE CBC W/AUTO DIFF WBC: CPT

## 2024-09-25 RX ORDER — AMLODIPINE BESYLATE 10 MG/1
10 TABLET ORAL DAILY
Status: DISCONTINUED | OUTPATIENT
Start: 2024-09-26 | End: 2024-09-27 | Stop reason: HOSPADM

## 2024-09-25 RX ORDER — PRAVASTATIN SODIUM 40 MG
40 TABLET ORAL
COMMUNITY

## 2024-09-25 RX ORDER — ALLOPURINOL 300 MG/1
300 TABLET ORAL DAILY
COMMUNITY

## 2024-09-25 RX ORDER — OXYBUTYNIN CHLORIDE 5 MG/1
5 TABLET ORAL
COMMUNITY

## 2024-09-25 RX ORDER — HYDROCODONE BITARTRATE AND ACETAMINOPHEN 10; 325 MG/1; MG/1
1 TABLET ORAL 2 TIMES DAILY PRN
COMMUNITY

## 2024-09-25 RX ORDER — HYDROCODONE BITARTRATE AND ACETAMINOPHEN 5; 325 MG/1; MG/1
1 TABLET ORAL EVERY 6 HOURS PRN
COMMUNITY
End: 2024-09-25

## 2024-09-25 RX ORDER — OXYCODONE HYDROCHLORIDE 5 MG/1
5 TABLET ORAL
Status: DISCONTINUED | OUTPATIENT
Start: 2024-09-25 | End: 2024-09-27 | Stop reason: HOSPADM

## 2024-09-25 RX ORDER — OXYBUTYNIN CHLORIDE 5 MG/1
5 TABLET, EXTENDED RELEASE ORAL EVERY MORNING
Status: DISCONTINUED | OUTPATIENT
Start: 2024-09-27 | End: 2024-09-27 | Stop reason: HOSPADM

## 2024-09-25 RX ORDER — ENOXAPARIN SODIUM 100 MG/ML
40 INJECTION SUBCUTANEOUS DAILY
Status: DISCONTINUED | OUTPATIENT
Start: 2024-09-25 | End: 2024-09-27 | Stop reason: HOSPADM

## 2024-09-25 RX ORDER — AMOXICILLIN 250 MG
2 CAPSULE ORAL EVERY EVENING
Status: DISCONTINUED | OUTPATIENT
Start: 2024-09-25 | End: 2024-09-27 | Stop reason: HOSPADM

## 2024-09-25 RX ORDER — FUROSEMIDE 10 MG/ML
40 INJECTION INTRAMUSCULAR; INTRAVENOUS EVERY 8 HOURS
Status: DISCONTINUED | OUTPATIENT
Start: 2024-09-26 | End: 2024-09-26

## 2024-09-25 RX ORDER — ALPRAZOLAM 0.25 MG/1
0.25 TABLET ORAL NIGHTLY PRN
Status: DISCONTINUED | OUTPATIENT
Start: 2024-09-25 | End: 2024-09-27 | Stop reason: HOSPADM

## 2024-09-25 RX ORDER — ONDANSETRON 2 MG/ML
4 INJECTION INTRAMUSCULAR; INTRAVENOUS EVERY 4 HOURS PRN
Status: DISCONTINUED | OUTPATIENT
Start: 2024-09-25 | End: 2024-09-27 | Stop reason: HOSPADM

## 2024-09-25 RX ORDER — ALLOPURINOL 300 MG/1
300 TABLET ORAL DAILY
Status: DISCONTINUED | OUTPATIENT
Start: 2024-09-26 | End: 2024-09-27 | Stop reason: HOSPADM

## 2024-09-25 RX ORDER — HYDRALAZINE HYDROCHLORIDE 20 MG/ML
10 INJECTION INTRAMUSCULAR; INTRAVENOUS EVERY 4 HOURS PRN
Status: DISCONTINUED | OUTPATIENT
Start: 2024-09-25 | End: 2024-09-27 | Stop reason: HOSPADM

## 2024-09-25 RX ORDER — AMLODIPINE BESYLATE 10 MG/1
10 TABLET ORAL DAILY
COMMUNITY
Start: 2024-06-24

## 2024-09-25 RX ORDER — PRAVASTATIN SODIUM 20 MG
40 TABLET ORAL DAILY
Status: DISCONTINUED | OUTPATIENT
Start: 2024-09-26 | End: 2024-09-27 | Stop reason: HOSPADM

## 2024-09-25 RX ORDER — ONDANSETRON 4 MG/1
4 TABLET, ORALLY DISINTEGRATING ORAL EVERY 4 HOURS PRN
Status: DISCONTINUED | OUTPATIENT
Start: 2024-09-25 | End: 2024-09-27 | Stop reason: HOSPADM

## 2024-09-25 RX ORDER — MORPHINE SULFATE 4 MG/ML
2 INJECTION INTRAVENOUS
Status: DISCONTINUED | OUTPATIENT
Start: 2024-09-25 | End: 2024-09-27 | Stop reason: HOSPADM

## 2024-09-25 RX ORDER — ASPIRIN 81 MG/1
324 TABLET, CHEWABLE ORAL ONCE
Status: COMPLETED | OUTPATIENT
Start: 2024-09-25 | End: 2024-09-25

## 2024-09-25 RX ORDER — HYDROCODONE BITARTRATE AND ACETAMINOPHEN 10; 325 MG/1; MG/1
1 TABLET ORAL 2 TIMES DAILY PRN
Status: DISCONTINUED | OUTPATIENT
Start: 2024-09-25 | End: 2024-09-27 | Stop reason: HOSPADM

## 2024-09-25 RX ORDER — FUROSEMIDE 10 MG/ML
40 INJECTION INTRAMUSCULAR; INTRAVENOUS EVERY 8 HOURS
Status: DISCONTINUED | OUTPATIENT
Start: 2024-09-25 | End: 2024-09-25

## 2024-09-25 RX ORDER — UREA 10 %
500 LOTION (ML) TOPICAL DAILY
Status: DISCONTINUED | OUTPATIENT
Start: 2024-09-26 | End: 2024-09-27 | Stop reason: HOSPADM

## 2024-09-25 RX ORDER — OXYBUTYNIN CHLORIDE 5 MG/1
5 TABLET, EXTENDED RELEASE ORAL EVERY EVENING
Status: DISCONTINUED | OUTPATIENT
Start: 2024-09-25 | End: 2024-09-25

## 2024-09-25 RX ORDER — POLYETHYLENE GLYCOL 3350 17 G/17G
1 POWDER, FOR SOLUTION ORAL
Status: DISCONTINUED | OUTPATIENT
Start: 2024-09-25 | End: 2024-09-27 | Stop reason: HOSPADM

## 2024-09-25 RX ORDER — MULTIVITAMIN
1 TABLET ORAL DAILY
COMMUNITY

## 2024-09-25 RX ORDER — ACETAMINOPHEN 325 MG/1
650 TABLET ORAL EVERY 6 HOURS PRN
Status: DISCONTINUED | OUTPATIENT
Start: 2024-09-25 | End: 2024-09-27 | Stop reason: HOSPADM

## 2024-09-25 RX ORDER — LISINOPRIL 10 MG/1
10 TABLET ORAL DAILY
COMMUNITY
End: 2024-09-25

## 2024-09-25 RX ORDER — OXYCODONE HYDROCHLORIDE 5 MG/1
2.5 TABLET ORAL
Status: DISCONTINUED | OUTPATIENT
Start: 2024-09-25 | End: 2024-09-27 | Stop reason: HOSPADM

## 2024-09-25 RX ADMIN — HYDRALAZINE HYDROCHLORIDE 10 MG: 20 INJECTION INTRAMUSCULAR; INTRAVENOUS at 19:35

## 2024-09-25 RX ADMIN — ALPRAZOLAM 0.25 MG: 0.25 TABLET ORAL at 20:45

## 2024-09-25 RX ADMIN — ENOXAPARIN SODIUM 40 MG: 100 INJECTION SUBCUTANEOUS at 20:45

## 2024-09-25 RX ADMIN — FUROSEMIDE 40 MG: 10 INJECTION INTRAMUSCULAR; INTRAVENOUS at 19:01

## 2024-09-25 RX ADMIN — ASPIRIN 324 MG: 81 TABLET, CHEWABLE ORAL at 19:00

## 2024-09-25 SDOH — ECONOMIC STABILITY: TRANSPORTATION INSECURITY
IN THE PAST 12 MONTHS, HAS LACK OF RELIABLE TRANSPORTATION KEPT YOU FROM MEDICAL APPOINTMENTS, MEETINGS, WORK OR FROM GETTING THINGS NEEDED FOR DAILY LIVING?: NO

## 2024-09-25 SDOH — ECONOMIC STABILITY: TRANSPORTATION INSECURITY
IN THE PAST 12 MONTHS, HAS THE LACK OF TRANSPORTATION KEPT YOU FROM MEDICAL APPOINTMENTS OR FROM GETTING MEDICATIONS?: NO

## 2024-09-25 ASSESSMENT — ENCOUNTER SYMPTOMS
TREMORS: 0
BACK PAIN: 0
NERVOUS/ANXIOUS: 0
COUGH: 0
BLURRED VISION: 0
HEMOPTYSIS: 0
WEIGHT LOSS: 0
PALPITATIONS: 0
FOCAL WEAKNESS: 0
BRUISES/BLEEDS EASILY: 0
CHILLS: 0
VOMITING: 0
DOUBLE VISION: 0
POLYDIPSIA: 0
HALLUCINATIONS: 0
PHOTOPHOBIA: 0
ORTHOPNEA: 0
NECK PAIN: 0
SPUTUM PRODUCTION: 0
HEARTBURN: 0
SHORTNESS OF BREATH: 1
FLANK PAIN: 0
FEVER: 0
HEADACHES: 0
SPEECH CHANGE: 0
NAUSEA: 0

## 2024-09-25 ASSESSMENT — LIFESTYLE VARIABLES
HAVE YOU EVER FELT YOU SHOULD CUT DOWN ON YOUR DRINKING: NO
TOTAL SCORE: 0
ON A TYPICAL DAY WHEN YOU DRINK ALCOHOL HOW MANY DRINKS DO YOU HAVE: 1
CONSUMPTION TOTAL: NEGATIVE
EVER HAD A DRINK FIRST THING IN THE MORNING TO STEADY YOUR NERVES TO GET RID OF A HANGOVER: NO
EVER FELT BAD OR GUILTY ABOUT YOUR DRINKING: NO
ALCOHOL_USE: YES
AVERAGE NUMBER OF DAYS PER WEEK YOU HAVE A DRINK CONTAINING ALCOHOL: 1
SUBSTANCE_ABUSE: 0
HOW MANY TIMES IN THE PAST YEAR HAVE YOU HAD 5 OR MORE DRINKS IN A DAY: 0
DOES PATIENT WANT TO STOP DRINKING: NO
TOTAL SCORE: 0
HAVE PEOPLE ANNOYED YOU BY CRITICIZING YOUR DRINKING: NO
TOTAL SCORE: 0

## 2024-09-25 ASSESSMENT — COGNITIVE AND FUNCTIONAL STATUS - GENERAL
SUGGESTED CMS G CODE MODIFIER MOBILITY: CI
DAILY ACTIVITIY SCORE: 24
SUGGESTED CMS G CODE MODIFIER DAILY ACTIVITY: CH
MOBILITY SCORE: 23
WALKING IN HOSPITAL ROOM: A LITTLE

## 2024-09-25 ASSESSMENT — HEART SCORE
HISTORY: SLIGHTLY SUSPICIOUS
TROPONIN: 1-3 TIMES NORMAL LIMIT
HEART SCORE: 5
AGE: 65+
RISK FACTORS: 1-2 RISK FACTORS
ECG: NON-SPECIFIC REPOLARIZATION DISTURBANCE

## 2024-09-25 ASSESSMENT — SOCIAL DETERMINANTS OF HEALTH (SDOH)
WITHIN THE PAST 12 MONTHS, YOU WORRIED THAT YOUR FOOD WOULD RUN OUT BEFORE YOU GOT THE MONEY TO BUY MORE: NEVER TRUE
WITHIN THE LAST YEAR, HAVE YOU BEEN HUMILIATED OR EMOTIONALLY ABUSED IN OTHER WAYS BY YOUR PARTNER OR EX-PARTNER?: NO
WITHIN THE LAST YEAR, HAVE YOU BEEN AFRAID OF YOUR PARTNER OR EX-PARTNER?: NO
WITHIN THE PAST 12 MONTHS, THE FOOD YOU BOUGHT JUST DIDN'T LAST AND YOU DIDN'T HAVE MONEY TO GET MORE: NEVER TRUE
WITHIN THE LAST YEAR, HAVE YOU BEEN KICKED, HIT, SLAPPED, OR OTHERWISE PHYSICALLY HURT BY YOUR PARTNER OR EX-PARTNER?: NO
WITHIN THE LAST YEAR, HAVE TO BEEN RAPED OR FORCED TO HAVE ANY KIND OF SEXUAL ACTIVITY BY YOUR PARTNER OR EX-PARTNER?: NO
IN THE PAST 12 MONTHS, HAS THE ELECTRIC, GAS, OIL, OR WATER COMPANY THREATENED TO SHUT OFF SERVICE IN YOUR HOME?: NO

## 2024-09-25 ASSESSMENT — FIBROSIS 4 INDEX
FIB4 SCORE: 2.357022603955158414
FIB4 SCORE: 2.357022603955158414

## 2024-09-25 ASSESSMENT — PATIENT HEALTH QUESTIONNAIRE - PHQ9
2. FEELING DOWN, DEPRESSED, IRRITABLE, OR HOPELESS: NOT AT ALL
SUM OF ALL RESPONSES TO PHQ9 QUESTIONS 1 AND 2: 0
1. LITTLE INTEREST OR PLEASURE IN DOING THINGS: NOT AT ALL

## 2024-09-25 ASSESSMENT — PAIN DESCRIPTION - PAIN TYPE: TYPE: CHRONIC PAIN

## 2024-09-25 NOTE — PROGRESS NOTES
"Subjective:   CHIEF COMPLAINT  Chief Complaint   Patient presents with    Shortness of Breath     When he lays down at night, mentions anxiety issues, x10d            History of Present Illness  Claudio Steen is a 69 y.o. male who presents for evaluation of shortness of breath. He is accompanied by an adult female.    A few months ago, he experienced shortness of breath which improved over time. However, in the past week, he has been unable to lie down due to difficulty breathing, which triggers his anxiety.  His symptoms have worsened since his arrival here. He finds relief from his shortness of breath when he is outside.  Has not eaten for last 2 days due to lack of appetite.  Positive ROS for bilateral lower extremity edema but no significant weight gain.  Does not experience associated symptoms of chest pain, palpitations or irregular heartbeats.  No cough.  No wheezing.    Has a medical history of \"leaky valve\" which he follows with cardiology and is under observation.  Denies any additional cardiac history.  No history of CKD.     He is not diabetic but has high cholesterol and is on statin medication. He occasionally smokes weed but does not smoke cigarettes.    Visiting [place] from [place].    FAMILY HISTORY  His parents have a history of cardiac disease.        REVIEW OF SYSTEMS  General: no fever or chills  GI: no nausea or vomiting  See HPI for further details.    PAST MEDICAL HISTORY  There are no problems to display for this patient.      SURGICAL HISTORY  patient denies any surgical history    ALLERGIES  No Known Allergies    CURRENT MEDICATIONS  allopurinol Tabs  amLODIPine Tabs  cyanocobalamin  HYDROcodone-acetaminophen Tabs  lisinopril Tabs  Multi-Day Vitamins Tabs  [place]ley 3 Caps  OXYBUTYNIN CHLORIDE PO  PRAVASTATIN SODIUM PO  Vitamin D3 Caps    SOCIAL HISTORY  Social History     Tobacco Use    Smoking status: Not on file    Smokeless tobacco: Not on file   Substance and Sexual Activity    Alcohol use: " "Not on file    Drug use: Not on file    Sexual activity: Not on file       FAMILY HISTORY  No family history on file.       Objective:   PHYSICAL EXAM  VITAL SIGNS: BP (!) 154/66 (BP Location: Right arm, Patient Position: Sitting, BP Cuff Size: Adult)   Pulse (!) 55   Temp 37.1 °C (98.8 °F) (Temporal)   Resp 12   Ht 1.88 m (6' 2\")   Wt 106 kg (233 lb)   SpO2 94%   BMI 29.92 kg/m²     Gen: no acute distress, normal voice  Skin: dry, intact, moist mucosal membranes  Eyes: No conjunctival injection bilaterally.  Neck: Normal range of motion. No meningeal signs.   Lungs: No increased work of breathing.  CTAB w/ symmetric expansion  CV: RRR w/ loud holosystolic.  Bilateral pitting edema lower extremities.  Presence of JVD.  No bruits.  Psych: normal affect, normal judgement, alert, awake    EC2024  54 bpm.  Sinus rhythm  Left axis deviation  Nonspecific T wave abnormalities  Incomplete bundle branch block  Prolonged NH interval  No ST segment elevation or depression  T wave flattening inferior leads  No previous ECGs for comparison.      RADIOLOGY RESULTS   DX-CHEST-2 VIEWS    Result Date: 2024 3:00 PM HISTORY/REASON FOR EXAM:  Shortness of Breath. TECHNIQUE/EXAM DESCRIPTION AND NUMBER OF VIEWS: Two views of the chest. COMPARISON:  None. FINDINGS: The lungs are clear. The cardiac silhouette is enlarged. There is no evidence of pleural effusion or pneumothorax. Imaged osseous structures are grossly unremarkable.     Mild enlargement of the cardiomediastinal silhouette without acute cardiopulmonary abnormality.             Assessment/Plan:     1. Dyspnea, unspecified type  EKG - Clinic Performed    DX-CHEST-2 VIEWS      2. Shortness of breath  EKG - Clinic Performed      3. Orthopnea        History and exam consistent with new onset heart failure.  ECG negative for any acute ischemic changes.  Patient was sent to regional ED for further evaluation.  He will be going with his wife and " daughter.  Transfer center was contacted.  All questions were answered.        Please note that this dictation was created using voice recognition software. I have made a reasonable attempt to correct obvious errors, but I expect that there are errors of grammar and possibly content that I did not discover before finalizing the note.

## 2024-09-26 LAB
ALBUMIN SERPL BCP-MCNC: 4.3 G/DL (ref 3.2–4.9)
ALBUMIN/GLOB SERPL: 1.5 G/DL
ALP SERPL-CCNC: 70 U/L (ref 30–99)
ALT SERPL-CCNC: 18 U/L (ref 2–50)
AMPHET UR QL SCN: NEGATIVE
ANION GAP SERPL CALC-SCNC: 15 MMOL/L (ref 7–16)
AST SERPL-CCNC: 37 U/L (ref 12–45)
BARBITURATES UR QL SCN: NEGATIVE
BASOPHILS # BLD AUTO: 0.7 % (ref 0–1.8)
BASOPHILS # BLD: 0.06 K/UL (ref 0–0.12)
BENZODIAZ UR QL SCN: NEGATIVE
BILIRUB SERPL-MCNC: 0.8 MG/DL (ref 0.1–1.5)
BUN SERPL-MCNC: 13 MG/DL (ref 8–22)
BZE UR QL SCN: NEGATIVE
CALCIUM ALBUM COR SERPL-MCNC: 10 MG/DL (ref 8.5–10.5)
CALCIUM SERPL-MCNC: 10.2 MG/DL (ref 8.5–10.5)
CANNABINOIDS UR QL SCN: POSITIVE
CHLORIDE SERPL-SCNC: 101 MMOL/L (ref 96–112)
CO2 SERPL-SCNC: 23 MMOL/L (ref 20–33)
CREAT SERPL-MCNC: 0.73 MG/DL (ref 0.5–1.4)
EOSINOPHIL # BLD AUTO: 0.1 K/UL (ref 0–0.51)
EOSINOPHIL NFR BLD: 1.2 % (ref 0–6.9)
ERYTHROCYTE [DISTWIDTH] IN BLOOD BY AUTOMATED COUNT: 43.3 FL (ref 35.9–50)
FENTANYL UR QL: NEGATIVE
GFR SERPLBLD CREATININE-BSD FMLA CKD-EPI: 98 ML/MIN/1.73 M 2
GLOBULIN SER CALC-MCNC: 2.9 G/DL (ref 1.9–3.5)
GLUCOSE SERPL-MCNC: 100 MG/DL (ref 65–99)
HCT VFR BLD AUTO: 46.4 % (ref 42–52)
HGB BLD-MCNC: 16 G/DL (ref 14–18)
IMM GRANULOCYTES # BLD AUTO: 0.04 K/UL (ref 0–0.11)
IMM GRANULOCYTES NFR BLD AUTO: 0.5 % (ref 0–0.9)
LYMPHOCYTES # BLD AUTO: 1.56 K/UL (ref 1–4.8)
LYMPHOCYTES NFR BLD: 18 % (ref 22–41)
MAGNESIUM SERPL-MCNC: 2 MG/DL (ref 1.5–2.5)
MCH RBC QN AUTO: 30.9 PG (ref 27–33)
MCHC RBC AUTO-ENTMCNC: 34.5 G/DL (ref 32.3–36.5)
MCV RBC AUTO: 89.6 FL (ref 81.4–97.8)
METHADONE UR QL SCN: NEGATIVE
MONOCYTES # BLD AUTO: 0.85 K/UL (ref 0–0.85)
MONOCYTES NFR BLD AUTO: 9.8 % (ref 0–13.4)
NEUTROPHILS # BLD AUTO: 6.04 K/UL (ref 1.82–7.42)
NEUTROPHILS NFR BLD: 69.8 % (ref 44–72)
NRBC # BLD AUTO: 0 K/UL
NRBC BLD-RTO: 0 /100 WBC (ref 0–0.2)
NT-PROBNP SERPL IA-MCNC: 3453 PG/ML (ref 0–125)
OPIATES UR QL SCN: POSITIVE
OXYCODONE UR QL SCN: NEGATIVE
PCP UR QL SCN: NEGATIVE
PLATELET # BLD AUTO: 177 K/UL (ref 164–446)
PMV BLD AUTO: 9.9 FL (ref 9–12.9)
POTASSIUM SERPL-SCNC: 3.9 MMOL/L (ref 3.6–5.5)
PROPOXYPH UR QL SCN: NEGATIVE
PROT SERPL-MCNC: 7.2 G/DL (ref 6–8.2)
RBC # BLD AUTO: 5.18 M/UL (ref 4.7–6.1)
SODIUM SERPL-SCNC: 139 MMOL/L (ref 135–145)
T4 FREE SERPL-MCNC: 1.62 NG/DL (ref 0.93–1.7)
TROPONIN T SERPL-MCNC: 35 NG/L (ref 6–19)
WBC # BLD AUTO: 8.7 K/UL (ref 4.8–10.8)

## 2024-09-26 PROCEDURE — 36415 COLL VENOUS BLD VENIPUNCTURE: CPT

## 2024-09-26 PROCEDURE — 84439 ASSAY OF FREE THYROXINE: CPT

## 2024-09-26 PROCEDURE — 700102 HCHG RX REV CODE 250 W/ 637 OVERRIDE(OP): Performed by: INTERNAL MEDICINE

## 2024-09-26 PROCEDURE — 80307 DRUG TEST PRSMV CHEM ANLYZR: CPT

## 2024-09-26 PROCEDURE — 700111 HCHG RX REV CODE 636 W/ 250 OVERRIDE (IP): Mod: JZ | Performed by: INTERNAL MEDICINE

## 2024-09-26 PROCEDURE — A9270 NON-COVERED ITEM OR SERVICE: HCPCS | Performed by: INTERNAL MEDICINE

## 2024-09-26 PROCEDURE — 83735 ASSAY OF MAGNESIUM: CPT

## 2024-09-26 PROCEDURE — 80053 COMPREHEN METABOLIC PANEL: CPT

## 2024-09-26 PROCEDURE — 85025 COMPLETE CBC W/AUTO DIFF WBC: CPT

## 2024-09-26 PROCEDURE — 700102 HCHG RX REV CODE 250 W/ 637 OVERRIDE(OP)

## 2024-09-26 PROCEDURE — 99233 SBSQ HOSP IP/OBS HIGH 50: CPT | Performed by: INTERNAL MEDICINE

## 2024-09-26 PROCEDURE — A9270 NON-COVERED ITEM OR SERVICE: HCPCS

## 2024-09-26 PROCEDURE — 770020 HCHG ROOM/CARE - TELE (206)

## 2024-09-26 PROCEDURE — 83880 ASSAY OF NATRIURETIC PEPTIDE: CPT

## 2024-09-26 PROCEDURE — 84484 ASSAY OF TROPONIN QUANT: CPT

## 2024-09-26 RX ORDER — TRAZODONE HYDROCHLORIDE 50 MG/1
50 TABLET, FILM COATED ORAL ONCE
Status: COMPLETED | OUTPATIENT
Start: 2024-09-26 | End: 2024-09-26

## 2024-09-26 RX ORDER — TAMSULOSIN HYDROCHLORIDE 0.4 MG/1
0.4 CAPSULE ORAL EVERY EVENING
Status: DISCONTINUED | OUTPATIENT
Start: 2024-09-26 | End: 2024-09-27 | Stop reason: HOSPADM

## 2024-09-26 RX ORDER — FUROSEMIDE 10 MG/ML
40 INJECTION INTRAMUSCULAR; INTRAVENOUS
Status: DISCONTINUED | OUTPATIENT
Start: 2024-09-26 | End: 2024-09-27

## 2024-09-26 RX ADMIN — CYANOCOBALAMIN TAB 500 MCG 500 MCG: 500 TAB at 06:24

## 2024-09-26 RX ADMIN — ENOXAPARIN SODIUM 40 MG: 100 INJECTION SUBCUTANEOUS at 16:35

## 2024-09-26 RX ADMIN — AMLODIPINE BESYLATE 10 MG: 10 TABLET ORAL at 06:24

## 2024-09-26 RX ADMIN — FUROSEMIDE 40 MG: 10 INJECTION INTRAMUSCULAR; INTRAVENOUS at 06:24

## 2024-09-26 RX ADMIN — FUROSEMIDE 40 MG: 10 INJECTION INTRAMUSCULAR; INTRAVENOUS at 16:34

## 2024-09-26 RX ADMIN — TRAZODONE HYDROCHLORIDE 50 MG: 50 TABLET ORAL at 22:51

## 2024-09-26 RX ADMIN — TAMSULOSIN HYDROCHLORIDE 0.4 MG: 0.4 CAPSULE ORAL at 20:38

## 2024-09-26 RX ADMIN — CHOLECALCIFEROL (VITAMIN D3) 10 MCG (400 UNIT) TABLET 400 UNITS: at 06:24

## 2024-09-26 RX ADMIN — ALLOPURINOL 300 MG: 300 TABLET ORAL at 06:23

## 2024-09-26 RX ADMIN — HYDROCODONE BITARTRATE AND ACETAMINOPHEN 1 TABLET: 10; 325 TABLET ORAL at 03:49

## 2024-09-26 RX ADMIN — PRAVASTATIN SODIUM 40 MG: 20 TABLET ORAL at 06:23

## 2024-09-26 ASSESSMENT — ENCOUNTER SYMPTOMS: SHORTNESS OF BREATH: 1

## 2024-09-26 ASSESSMENT — SOCIAL DETERMINANTS OF HEALTH (SDOH)
IN THE PAST 12 MONTHS, HAS THE ELECTRIC, GAS, OIL, OR WATER COMPANY THREATENED TO SHUT OFF SERVICE IN YOUR HOME?: NO
WITHIN THE PAST 12 MONTHS, THE FOOD YOU BOUGHT JUST DIDN'T LAST AND YOU DIDN'T HAVE MONEY TO GET MORE: NEVER TRUE
WITHIN THE PAST 12 MONTHS, YOU WORRIED THAT YOUR FOOD WOULD RUN OUT BEFORE YOU GOT THE MONEY TO BUY MORE: NEVER TRUE

## 2024-09-26 ASSESSMENT — PAIN DESCRIPTION - PAIN TYPE: TYPE: ACUTE PAIN

## 2024-09-26 ASSESSMENT — FIBROSIS 4 INDEX: FIB4 SCORE: 2.42

## 2024-09-26 NOTE — CARE PLAN
The patient is Stable - Low risk of patient condition declining or worsening    Shift Goals  Clinical Goals: Monitor labs/VS, echo, diurese  Patient Goals: To go home  Family Goals: NA    Progress made toward(s) clinical / shift goals:      Problem: Knowledge Deficit - Standard  Goal: Patient and family/care givers will demonstrate understanding of plan of care, disease process/condition, diagnostic tests and medications  Description: Target End Date:  1-3 days or as soon as patient condition allows    Document in Patient Education    1.  Patient and family/caregiver oriented to unit, equipment, visitation policy and means for communicating concern  2.  Complete/review Learning Assessment  3.  Assess knowledge level of disease process/condition, treatment plan, diagnostic tests and medications  4.  Explain disease process/condition, treatment plan, diagnostic tests and medications  Outcome: Progressing     Problem: Pain - Standard  Goal: Alleviation of pain or a reduction in pain to the patient’s comfort goal  Description: Target End Date:  Prior to discharge or change in level of care    Document on Vitals flowsheet    1.  Document pain using the appropriate pain scale per order or unit policy  2.  Educate and implement non-pharmacologic comfort measures (i.e. relaxation, distraction, massage, cold/heat therapy, etc.)  3.  Pain management medications as ordered  4.  Reassess pain after pain med administration per policy  5.  If opiods administered assess patient's response to pain medication is appropriate per POSS sedation scale  6.  Follow pain management plan developed in collaboration with patient and interdisciplinary team (including palliative care or pain specialists if applicable)  Outcome: Progressing       Patient is not progressing towards the following goals:NA

## 2024-09-26 NOTE — ED TRIAGE NOTES
"Chief Complaint   Patient presents with    Shortness of Breath     X 3 days, pt reports SOB that is worse when lays down. Pt denies cough, congestion or flu like symptoms.   Pt reports hx of HTN and hyperlipidemia.        70 yo M to triage for above complaint. SOB protocol ordered.      Pt placed in lobby. Pt educated on triage process. Pt encouraged to alert staff for any changes.     Patient and staff wearing appropriate PPE    BP (!) 161/57   Pulse (!) 58   Temp 36.6 °C (97.8 °F) (Temporal)   Resp 16   Ht 1.88 m (6' 2\")   Wt 105 kg (231 lb 14.8 oz)   SpO2 95%   BMI 29.78 kg/m²     "

## 2024-09-26 NOTE — CARE PLAN
The patient is Watcher - Medium risk of patient condition declining or worsening    Shift Goals  Clinical Goals: diurese  Patient Goals: rest  Family Goals: updates    Progress made toward(s) clinical / shift goals:    Problem: Knowledge Deficit - Standard  Goal: Patient and family/care givers will demonstrate understanding of plan of care, disease process/condition, diagnostic tests and medications  Outcome: Progressing  Note: Pt educated on POC and whiteboard updated upon arrival to unit.       Patient is not progressing towards the following goals:

## 2024-09-26 NOTE — ASSESSMENT & PLAN NOTE
9/26/2024  Troponin 40.  Patient denies chest pain  Probably demand ischemia due to heart failure exacerbation  Plan to repeat troponin and obtain transthoracic echo  IV Lasix

## 2024-09-26 NOTE — H&P
Hospital Medicine History & Physical Note    Date of Service  9/25/2024    Primary Care Physician  Pcp Pt States None      Code Status  Full Code    Chief Complaint  Chief Complaint   Patient presents with    Shortness of Breath     X 3 days, pt reports SOB that is worse when lays down. Pt denies cough, congestion or flu like symptoms.   Pt reports hx of HTN and hyperlipidemia.        History of Presenting Illness  Claudio Steen is a 69 y.o. male with past medical history of hypertension, anxiety, aortic valve regurgitation, diastolic dysfunction grade 1, gout, obstructive sleep apnea, who presented 9/25/2024 with complaints of worsening in office dyspnea on exertion in the last 2 days, bilateral lower extremity edema, abdominal distention.  Last 2 nights he could not sleep due to shortness of breath.  He is from [place], visiting her daughter who lives here  Vital signs in ER showed hypertension 161/57, heart rate 58 on room air.  EKG: Sinus rhythm heart rate 54, prolonged VT interval, LAFB, LVH  Chest x-ray: Cardiomegaly  Blood work: Troponin 40, proBNP 2500.  He denies chest pain or cough or fever.  Requested admission for new onset heart failure.  Treatment in the ED included Lasix 40 mg IV once, aspirin 325 mg  .  I discussed the plan of care with patient and ERP .    Review of Systems  Review of Systems   Constitutional:  Negative for chills, fever and weight loss.   HENT:  Negative for ear pain, hearing loss and tinnitus.    Eyes:  Negative for blurred vision, double vision and photophobia.   Respiratory:  Positive for shortness of breath. Negative for cough, hemoptysis and sputum production.    Cardiovascular:  Positive for leg swelling. Negative for chest pain, palpitations and orthopnea.   Gastrointestinal:  Negative for heartburn, nausea and vomiting.   Genitourinary:  Negative for dysuria, flank pain, frequency and hematuria.   Musculoskeletal:  Negative for back pain, joint pain and neck  pain.   Skin:  Negative for itching and rash.   Neurological:  Negative for tremors, speech change, focal weakness and headaches.   Endo/Heme/Allergies:  Negative for environmental allergies and polydipsia. Does not bruise/bleed easily.   Psychiatric/Behavioral:  Negative for hallucinations and substance abuse. The patient is not nervous/anxious.        Past Medical History   has a past medical history of Hypertension.    Surgical History   has no past surgical history on file.     Family History  family history is not on file.   Family history reviewed with patient. There is no family history that is pertinent to the chief complaint.     Social History   reports that he has never smoked. He has never used smokeless tobacco. He reports that he does not currently use alcohol. He reports current drug use.    Allergies  No Known Allergies    Medications  Prior to Admission Medications   Prescriptions Last Dose Informant Patient Reported? Taking?   Cholecalciferol (VITAMIN D3 PO) 9/25/2024 at AM  Yes Yes   Sig: Take 1 Capsule by mouth every day.   Cyanocobalamin (VITAMIN B12 PO) 9/25/2024 at AM  Yes Yes   Sig: Take 1 Tablet by mouth every day.   HYDROcodone/acetaminophen (NORCO)  MG Tab 9/25/2024 at AM  Yes Yes   Sig: Take 1 Tablet by mouth 2 times a day as needed for Moderate Pain.   Multiple Vitamin (MULTI-DAY VITAMINS) Tab 9/25/2024 at AM  Yes Yes   Sig: Take 1 Tablet by mouth every day.   OXYBUTYNIN CHLORIDE PO   Yes No   Sig: Take  by mouth.   Omega-3 Fatty Acids (OMEGA 3 PO) 9/25/2024 at AM  Yes Yes   Sig: Take 1 Capsule by mouth every day.   allopurinol (ZYLOPRIM) 300 MG Tab 9/25/2024 at AM  Yes Yes   Sig: Take 300 mg by mouth every day.   amLODIPine (NORVASC) 10 MG Tab 9/25/2024 at AM  Yes Yes   Sig: Take 10 mg by mouth every day.   pravastatin (PRAVACHOL) 40 MG tablet 9/25/2024 at AM  Yes Yes   Sig: Take 40 mg by mouth every day.      Facility-Administered Medications: None       Physical Exam  Temp:   [36.6 °C (97.8 °F)-37.1 °C (98.8 °F)] 36.6 °C (97.8 °F)  Pulse:  [55-58] 58  Resp:  [12-16] 16  BP: (154-161)/(57-66) 161/57  SpO2:  [94 %-95 %] 95 %  Blood Pressure : (!) 161/57   Temperature: 36.6 °C (97.8 °F)   Pulse: (!) 58   Respiration: 16   Pulse Oximetry: 95 %       Physical Exam  Vitals and nursing note reviewed.   Constitutional:       General: He is not in acute distress.     Appearance: Normal appearance.   HENT:      Head: Normocephalic and atraumatic.      Nose: Nose normal.      Mouth/Throat:      Mouth: Mucous membranes are moist.   Eyes:      Extraocular Movements: Extraocular movements intact.      Pupils: Pupils are equal, round, and reactive to light.   Cardiovascular:      Rate and Rhythm: Normal rate and regular rhythm.   Pulmonary:      Effort: Pulmonary effort is normal.      Breath sounds: Examination of the right-lower field reveals decreased breath sounds. Examination of the left-lower field reveals decreased breath sounds. Decreased breath sounds present.   Abdominal:      General: Abdomen is flat. There is no distension.      Tenderness: There is no abdominal tenderness.   Musculoskeletal:         General: No swelling or deformity. Normal range of motion.      Cervical back: Normal range of motion and neck supple.      Right lower leg: Edema (Ankles) present.      Left lower leg: Edema (Ankles) present.   Skin:     General: Skin is warm and dry.   Neurological:      General: No focal deficit present.      Mental Status: He is alert and oriented to person, place, and time.   Psychiatric:         Mood and Affect: Mood normal.         Behavior: Behavior normal.         Laboratory:  Recent Labs     09/25/24  1724   WBC 8.9   RBC 5.12   HEMOGLOBIN 15.9   HEMATOCRIT 45.9   MCV 89.6   MCH 31.1   MCHC 34.6   RDW 43.6   PLATELETCT 182   MPV 9.4     Recent Labs     09/25/24  1724   SODIUM 137   POTASSIUM 3.9   CHLORIDE 101   CO2 23   GLUCOSE 101*   BUN 12   CREATININE 0.85   CALCIUM 10.3      Recent Labs     09/25/24  1724   ALTSGPT 18   ASTSGOT 26   ALKPHOSPHAT 73   TBILIRUBIN 0.8   GLUCOSE 101*         Recent Labs     09/25/24  1724   NTPROBNP 2597*         Recent Labs     09/25/24  1724   TROPONINT 40*       Imaging:  DX-CHEST-PORTABLE (1 VIEW)   Final Result      Cardiomegaly.      EC-ECHOCARDIOGRAM COMPLETE W/O CONT    (Results Pending)       X-Ray:  I have personally reviewed the images and compared with prior images.    Assessment/Plan:  Justification for Admission Status  I anticipate this patient will require at least two midnights for appropriate medical management, necessitating inpatient admission because new onset heart failure    Patient will need a Telemetry bed on MEDICAL service .  The need is secondary to new onset heart failure.    * Acute heart failure new onset with preserved EF- (present on admission)  Assessment & Plan  69-year-old male with history of hypertension, diastolic dysfunction grade 1 and aortic regurgitation might to moderate, with preserved EF 60% on echo in 2020, presented with worsening of dyspnea on exertion, lower extremity edema, abdominal distention  Plan: IV Lasix 40 mg every 8 hours  Potassium supplementation  Monitor input and output and daily weight  Follow repeat transthoracic echo  Trend troponins    Aortic regurgitation  Assessment & Plan  Follow repeat echo    Gout  Assessment & Plan  Continue allopurinol 300 mg daily      Sleep apnea  Assessment & Plan  Patient does not use CPAP.    Hypertensive urgency  Assessment & Plan  Blood pressure not well-controlled 200/100 despite taking amlodipine as prescribed 10 mg once a day  Resume amlodipine 10 mg p.o.  IV Lasix for volume reduction.  IV hydralazine as needed for blood pressure above 180 systolic  Monitor blood pressure, consider adding beta-blocker on ACE inhibitor depending on echo result and heart rate    Troponin level elevated  Assessment & Plan  Troponin 40.  Patient denies chest pain  Probably  demand ischemia due to heart failure exacerbation  Plan to repeat troponin and obtain transthoracic echo  IV Lasix        VTE prophylaxis: enoxaparin ppx

## 2024-09-26 NOTE — ED NOTES
Pt presenting to ED with c/o sob x 3 days, denies chest pain. Pt reports abd tightness and swelling, +2 pitting edema to bilateral LE noted. Pt is a&ox4, resps even and unlabored. Attached to all montiors. Sinus zechariah on cardiac monitor with no ectopy. Call light in reach.

## 2024-09-26 NOTE — PROGRESS NOTES
4 Eyes Skin Assessment Completed by THUAN Odonnell and REENA Langston.    Head WDL  Ears WDL  Nose WDL  Mouth WDL  Neck WDL  Breast/Chest WDL  Shoulder Blades WDL  Spine WDL  (R) Arm/Elbow/Hand Redness and Blanching dry  (L) Arm/Elbow/Hand Redness and Blanching  Abdomen WDL  Groin WDL  Scrotum/Coccyx/Buttocks WDL  (R) Leg Edema  (L) Leg Edema  (R) Heel/Foot/Toe Redness and Blanching  (L) Heel/Foot/Toe Redness and Blanching          Devices In Places Tele Box and Pulse Ox      Interventions In Place Pillows and Pressure Redistribution Mattress    Possible Skin Injury No    Pictures Uploaded Into Epic N/A  Wound Consult Placed N/A  RN Wound Prevention Protocol Ordered No

## 2024-09-26 NOTE — ED PROVIDER NOTES
ED Provider Note    CHIEF COMPLAINT  Chief Complaint   Patient presents with    Shortness of Breath     X 3 days, pt reports SOB that is worse when lays down. Pt denies cough, congestion or flu like symptoms.   Pt reports hx of HTN and hyperlipidemia.        EXTERNAL RECORDS REVIEWED  External ED Note reviewed outlined emergency department note by Dr. Shepard dated October 15, 2022.  Patient seen for flank pain, history of kidney stones.  and Outpatient Notes reviewed office visit urgent care progress note by Dr. Matson dated today.  Patient seen for shortness of breath more so when he lays down at night for the last 10 days.  Transferred to the emergency department for further evaluation and management.    HPI/ROS  LIMITATION TO HISTORY   Select: : None  OUTSIDE HISTORIAN(S):  Significant other relates no established history of heart disease beyond a heart murmur, sees a cardiologist for hypertension and dyslipidemia    Claudio Steen is a 69 y.o. male who presents for evaluation of acute dyspnea.  Patient relates symptoms started about 10 days ago.  He notes he had been worse the last 3 days, patient notes particular out of breath starting yesterday after he flew from [facility].  Relates improved when he stands up, he feels more out of breath when he tries to lie down and has been unable to lie flat.  No chest pain, no nausea, no vomiting.  No fever, no productive cough.  He does note swelling to his abdomen and his legs.  He is not on any diuretics and has no established history of heart disease/heart failure but does note he has a heart murmur and is treated for hypertension.  No exertional component.    PAST MEDICAL HISTORY   has a past medical history of Hypertension.    SURGICAL HISTORY  patient denies any surgical history    FAMILY HISTORY  History reviewed. No pertinent family history.    SOCIAL HISTORY  Social History     Tobacco Use    Smoking status: Never    Smokeless tobacco: Never  "  Vaping Use    Vaping status: Never Used   Substance and Sexual Activity    Alcohol use: Not Currently    Drug use: Yes     Comment: marijuana    Sexual activity: Not on file       CURRENT MEDICATIONS  Home Medications       Reviewed by Hood Velásquez (Pharmacy Tech) on 09/25/24 at 1913  Med List Status: Complete     Medication Last Dose Status   allopurinol (ZYLOPRIM) 300 MG Tab 9/25/2024 Active   amLODIPine (NORVASC) 10 MG Tab 9/25/2024 Active   Ascorbic Acid (VITAMIN C PO) 9/25/2024 Active   Cholecalciferol (VITAMIN D3 PO) 9/25/2024 Active   Cyanocobalamin (VITAMIN B12 PO) 9/25/2024 Active   HYDROcodone/acetaminophen (NORCO)  MG Tab 9/25/2024 Active   Multiple Vitamin (MULTI-DAY VITAMINS) Tab 9/25/2024 Active   Omega-3 Fatty Acids (OMEGA 3 PO) 9/25/2024 Active   oxybutynin (DITROPAN) 5 MG Tab 9/25/2024 Active   pravastatin (PRAVACHOL) 40 MG tablet 9/25/2024 Active                  Audit from Redirected Encounters    **Home medications have not yet been reviewed for this encounter**         ALLERGIES  No Known Allergies    PHYSICAL EXAM  VITAL SIGNS: BP (!) 173/60   Pulse 66   Temp 36.3 °C (97.3 °F) (Temporal)   Resp 18   Ht 1.88 m (6' 2\")   Wt 104 kg (229 lb 8 oz)   SpO2 94%   BMI 29.47 kg/m²    General: Alert, no acute distress  Skin: Warm, dry, normal for ethnicity  Head: Normocephalic, atraumatic  Neck: Trachea midline  Eye: PERRL, normal conjunctiva  ENMT: Oral mucosa moist, no pharyngeal erythema or exudate  Cardiovascular: Regular rate and rhythm, No murmur, Normal peripheral perfusion  Respiratory: Lungs diminished at both bases without santos rales or rhonchi, mildly tachypneic, otherwise respirations are non-labored, breath sounds are equal  Gastrointestinal: Soft, nontender, non distended  Musculoskeletal: 1+ bilateral pitting edema of the pretibial regions symmetrical bilaterally.  Neurological: Alert and oriented to person, place, time, and situation  Lymphatics: No lower extreme " lymphangitis  Psychiatric: Cooperative, appropriate mood & affect     EKG/LABS  Results for orders placed or performed during the hospital encounter of 09/25/24   CBC with Differential   Result Value Ref Range    WBC 8.9 4.8 - 10.8 K/uL    RBC 5.12 4.70 - 6.10 M/uL    Hemoglobin 15.9 14.0 - 18.0 g/dL    Hematocrit 45.9 42.0 - 52.0 %    MCV 89.6 81.4 - 97.8 fL    MCH 31.1 27.0 - 33.0 pg    MCHC 34.6 32.3 - 36.5 g/dL    RDW 43.6 35.9 - 50.0 fL    Platelet Count 182 164 - 446 K/uL    MPV 9.4 9.0 - 12.9 fL    Neutrophils-Polys 68.40 44.00 - 72.00 %    Lymphocytes 19.80 (L) 22.00 - 41.00 %    Monocytes 9.20 0.00 - 13.40 %    Eosinophils 1.60 0.00 - 6.90 %    Basophils 0.80 0.00 - 1.80 %    Immature Granulocytes 0.20 0.00 - 0.90 %    Nucleated RBC 0.00 0.00 - 0.20 /100 WBC    Neutrophils (Absolute) 6.12 1.82 - 7.42 K/uL    Lymphs (Absolute) 1.77 1.00 - 4.80 K/uL    Monos (Absolute) 0.82 0.00 - 0.85 K/uL    Eos (Absolute) 0.14 0.00 - 0.51 K/uL    Baso (Absolute) 0.07 0.00 - 0.12 K/uL    Immature Granulocytes (abs) 0.02 0.00 - 0.11 K/uL    NRBC (Absolute) 0.00 K/uL   Comp Metabolic Panel   Result Value Ref Range    Sodium 137 135 - 145 mmol/L    Potassium 3.9 3.6 - 5.5 mmol/L    Chloride 101 96 - 112 mmol/L    Co2 23 20 - 33 mmol/L    Anion Gap 13.0 7.0 - 16.0    Glucose 101 (H) 65 - 99 mg/dL    Bun 12 8 - 22 mg/dL    Creatinine 0.85 0.50 - 1.40 mg/dL    Calcium 10.3 8.5 - 10.5 mg/dL    Correct Calcium 9.9 8.5 - 10.5 mg/dL    AST(SGOT) 26 12 - 45 U/L    ALT(SGPT) 18 2 - 50 U/L    Alkaline Phosphatase 73 30 - 99 U/L    Total Bilirubin 0.8 0.1 - 1.5 mg/dL    Albumin 4.5 3.2 - 4.9 g/dL    Total Protein 7.2 6.0 - 8.2 g/dL    Globulin 2.7 1.9 - 3.5 g/dL    A-G Ratio 1.7 g/dL   proBrain Natriuretic Peptide, NT   Result Value Ref Range    NT-proBNP 2597 (H) 0 - 125 pg/mL   Troponin   Result Value Ref Range    Troponin T 40 (H) 6 - 19 ng/L   ESTIMATED GFR   Result Value Ref Range    GFR (CKD-EPI) 93 >60 mL/min/1.73 m 2   TSH    Result Value Ref Range    TSH 7.870 (H) 0.350 - 5.500 uIU/mL   EKG (NOW)   Result Value Ref Range    Report       [facility] Emergency Dept.    Test Date:  2024  Pt Name:    CARLOS FLORES                Department: ER  MRN:        [id number]                      Room:  Gender:     Male                         Technician: [id number]  :        1955                   Requested By:ER TRIAGE PROTOCOL  Order #:    [id number]                    Reading MD: TROY LOPES MD    Measurements  Intervals                                Axis  Rate:       54                           P:          -34  SC:         312                          QRS:        -56  QRSD:       145                          T:          89  QT:         474  QTc:        450    Interpretive Statements  Sinus bradycardia  Prolonged SC interval  Left atrial enlargement  Left bundle branch block  No previous ECG available for comparison  Electronically Signed On 2024 18:12:10 PDT by TROY LOPES MD        I have independently interpreted this EKG    RADIOLOGY/PROCEDURES   I have independently interpreted the diagnostic imaging associated with this visit and am waiting the final reading from the radiologist.   My preliminary interpretation is as follows: Cardiomegaly noted    Radiologist interpretation:  DX-CHEST-PORTABLE (1 VIEW)   Final Result      Cardiomegaly.      EC-ECHOCARDIOGRAM COMPLETE W/O CONT    (Results Pending)       COURSE & MEDICAL DECISION MAKING    ASSESSMENT, COURSE AND PLAN  Care Narrative: This patient is a very pleasant 6-year-old gentleman presents for evaluation of acute dyspnea with orthopnea.  History and exam as above.  I suspect volume overload/CHF.  Indeed his BNP is sharply elevated. Thankfully chest x-ray demonstrates cardiomegaly but no marked pulmonary edema.  Treated with Lasix, troponin also found to be elevated.  Heart score is 5, moderate restratification.  Given no  "established history of CHF and concerning findings on his cardiac workup today patient will need to be admitted for further evaluation and management.  Plan is for echocardiogram.    CHEST PAIN:   HEART Score for Major Cardiac Events  HEART Score     History: Slightly suspicious  ECG: Non-specific repolarization disturbance  Age: 65+  Risk Factors: 1-2 risk factors  Troponin: 1-3 times normal limit    Heart Score: 5    Total Score   0-3 Points = Low Score, risk of MACE 0.9-1.7%.  4-6 Points = Moderate Score, risk of MACE 12-16.6%  7-10 Points = High Score, risk of MACE 50-65%    ED OBS: Yes; I am placing the patient in to an observation status due to a diagnostic uncertainty as well as therapeutic intensity. Patient placed in observation status at 6:11 PM, 9/25/2024.     Observation plan is as follows: Patient medicated with aspirin 324 mg p.o.  Cardiac workup with BNP will be obtained.  Differential diagnosis point includes was not restricted to acute coronary syndrome, CHF, volume overload, acute kidney injury, hepatic insufficiency    1848: Patient reassessed, still dyspneic, oxygen saturation is 89%.  Have ordered Lasix 40 mg IV given I suspect volume overload.    1856: I have heard back from the hospitalist Dr. Perez who concurs with plan of care thus far and accepts patient to his service for admission    Upon Reevaluation, the patient's condition has: not improved; and will be escalated to hospitalization.    Patient discharged from ED Observation status at 1857 (Time) 9/25/24 (Date).       Patient Vitals for the past 24 hrs:   BP Temp Temp src Pulse Resp SpO2 Height Weight   09/25/24 2114 (!) 173/60 36.3 °C (97.3 °F) Temporal 66 18 94 % 1.88 m (6' 2\") 104 kg (229 lb 8 oz)   09/25/24 2017 (!) 173/60 36.3 °C (97.3 °F) Temporal -- 18 94 % -- 104 kg (229 lb 8 oz)   09/25/24 1950 (!) 188/78 -- -- 66 18 91 % -- --   09/25/24 1930 (!) 208/88 -- -- 62 19 91 % -- --   09/25/24 1900 (!) 206/88 -- -- (!) 56 16 92 % " "-- --   09/25/24 1830 (!) 189/81 -- -- (!) 57 19 92 % -- --   09/25/24 1713 -- -- -- -- -- -- 1.88 m (6' 2\") 105 kg (231 lb 14.8 oz)   09/25/24 1710 (!) 161/57 36.6 °C (97.8 °F) Temporal (!) 58 16 95 % -- --        ADDITIONAL PROBLEMS MANAGED  Elevated blood pressure reading, dyspnea with orthopnea, elevated troponin, acute CHF    DISPOSITION AND DISCUSSIONS  I have discussed management of the patient with the following physicians and MARIANN's: Consulted with the hospitalist hospitalist Dr. Perez     Discussion of management with other Q or appropriate source(s): None     Escalation of care considered, and ultimately not performed:NA    Barriers to care at this time, including but not limited to: Patient does not have established PCP.     Decision tools and prescription drugs considered including, but not limited to:  Heart score is 5, moderate restratification .    FINAL DIAGNOSIS  1. Acute congestive heart failure, unspecified heart failure type (HCC)    2. Elevated troponin    3. Respiratory insufficiency    4. Hypertensive urgency         Electronically signed by: Nguyễn Lincoln M.D., 9/25/2024 6:10 PM      "

## 2024-09-26 NOTE — PROGRESS NOTES
[facility] Medicine Daily Progress Note    Date of Service  9/26/2024    Chief Complaint  Claudio Steen is a 69 y.o. male admitted 9/25/2024 with   Chief Complaint   Patient presents with    Shortness of Breath     X 3 days, pt reports SOB that is worse when lays down. Pt denies cough, congestion or flu like symptoms.   Pt reports hx of HTN and hyperlipidemia.        Hospital Course  No notes on file    Interval Problem Update  Patient was seen and examined at bedside.  I have personally reviewed and interpreted vitals, labs, and imaging.    9/26.  Afebrile.  Bradycardic this morning.  Has been hypertensive.  On 0 to 2 L nasal cannula.  Denies fever, chills, chest pains, shortness of breath.  Shortness of breath is improved.  Complains of decreased appetite.  Denies nausea and vomiting.  Reports some prostate issues and urinary retention.  TSH was elevated but free T4 was normal.  Echocardiogram pending.  Trop 40 > 35  BNP 3453    I have discussed this patient's plan of care and discharge plan at IDT rounds today with Case Management, Nursing, Nursing leadership, and other members of the IDT team.    Consultants/Specialty  None    Code Status  Full Code    Disposition  The patient is not medically cleared for discharge to home or a post-acute facility.  Anticipate discharge to: home with close outpatient follow-up    I have placed the appropriate orders for post-discharge needs.    Review of Systems  Review of Systems   Respiratory:  Positive for shortness of breath.         Physical Exam  Temp:  [36.3 °C (97.3 °F)-37.1 °C (98.8 °F)] 36.8 °C (98.2 °F)  Pulse:  [55-66] 58  Resp:  [12-19] 16  BP: (154-208)/(56-88) 165/71  SpO2:  [91 %-96 %] 96 %    Physical Exam  Vitals and nursing note reviewed.   Constitutional:       Appearance: Normal appearance. He is ill-appearing.   HENT:      Head: Normocephalic and atraumatic.      Nose: Nose normal.      Mouth/Throat:      Mouth: Mucous membranes are moist.       14-Aug-2022 10:04 Pharynx: Oropharynx is clear.   Eyes:      Extraocular Movements: Extraocular movements intact.      Conjunctiva/sclera: Conjunctivae normal.   Cardiovascular:      Rate and Rhythm: Normal rate and regular rhythm.      Pulses: Normal pulses.      Heart sounds: Normal heart sounds. No murmur heard.     No friction rub. No gallop.   Pulmonary:      Effort: Pulmonary effort is normal. No respiratory distress.      Breath sounds: Rales present. No wheezing.   Chest:      Chest wall: No tenderness.   Abdominal:      General: Abdomen is flat. Bowel sounds are normal. There is no distension.      Palpations: Abdomen is soft. There is no mass.      Tenderness: There is no abdominal tenderness. There is no guarding.   Musculoskeletal:         General: Normal range of motion.      Cervical back: Normal range of motion and neck supple.      Right lower leg: Edema present.      Left lower leg: Edema present.   Skin:     General: Skin is warm.      Capillary Refill: Capillary refill takes less than 2 seconds.   Neurological:      General: No focal deficit present.      Mental Status: He is alert and oriented to person, place, and time. Mental status is at baseline.      Cranial Nerves: No cranial nerve deficit.      Motor: No weakness.   Psychiatric:         Mood and Affect: Mood normal.         Behavior: Behavior normal.         Fluids    Intake/Output Summary (Last 24 hours) at 9/26/2024 0737  Last data filed at 9/26/2024 0600  Gross per 24 hour   Intake 0 ml   Output 500 ml   Net -500 ml        Laboratory  Recent Labs     09/25/24  1724 09/26/24  0143   WBC 8.9 8.7   RBC 5.12 5.18   HEMOGLOBIN 15.9 16.0   HEMATOCRIT 45.9 46.4   MCV 89.6 89.6   MCH 31.1 30.9   MCHC 34.6 34.5   RDW 43.6 43.3   PLATELETCT 182 177   MPV 9.4 9.9     Recent Labs     09/25/24  1724 09/26/24  0143   SODIUM 137 139   POTASSIUM 3.9 3.9   CHLORIDE 101 101   CO2 23 23   GLUCOSE 101* 100*   BUN 12 13   CREATININE 0.85 0.73   CALCIUM 10.3 10.2                    Imaging  DX-CHEST-PORTABLE (1 VIEW)   Final Result      Cardiomegaly.      EC-ECHOCARDIOGRAM COMPLETE W/O CONT    (Results Pending)        Assessment/Plan  * Acute heart failure new onset with preserved EF- (present on admission)  Assessment & Plan  9/26/2024  69-year-old male with history of hypertension, diastolic dysfunction grade 1 and aortic regurgitation might to moderate, with preserved EF 60% on echo in 2020, presented with worsening of dyspnea on exertion, lower extremity edema, abdominal distention  Plan: IV Lasix 40 mg every 8 hours  Potassium supplementation  Monitor input and output and daily weight  Follow repeat transthoracic echo  Trend troponins    Aortic regurgitation  Assessment & Plan  9/26/2024  Follow repeat echo    Gout  Assessment & Plan  9/26/2024  Continue allopurinol 300 mg daily    Sleep apnea  Assessment & Plan  9/26/2024  Patient does not use CPAP.    Hypertensive urgency  Assessment & Plan  9/26/2024  Blood pressure not well-controlled 200/100 despite taking amlodipine as prescribed 10 mg once a day  Resume amlodipine 10 mg p.o.  IV Lasix for volume reduction.  IV hydralazine as needed for blood pressure above 180 systolic  Monitor blood pressure, consider adding beta-blocker on ACE inhibitor depending on echo result and heart rate    Troponin level elevated  Assessment & Plan  9/26/2024  Troponin 40.  Patient denies chest pain  Probably demand ischemia due to heart failure exacerbation  Plan to repeat troponin and obtain transthoracic echo  IV Lasix          VTE prophylaxis: Lovenox    I have performed a physical exam and reviewed and updated ROS and Plan today (9/26/2024). In review of yesterday's note (9/25/2024), there are no changes except as documented above.    Greater than 51 minutes spent prepping to see patient (e.g. review of tests) obtaining and/or reviewing separately obtained history. Performing a medically appropriate examination and/ evaluation.  Counseling and  educating the patient/family/caregiver.  Ordering medications, tests, or procedures.  Referring and communicating with other health care professionals.  Documenting clinical information in EPIC.  Independently interpreting results and communicating results to patient/family/caregiver.  Care coordination.

## 2024-09-26 NOTE — ED NOTES
To floor via gurney with RN; on monitor; spouse at bedside; on room air; AOX4 GCS15 all belongings with patient

## 2024-09-26 NOTE — ED NOTES
Med Rec complete per pharmacy   Allergies reviewed  Antibiotics in the past 30 days:no  Anticoagulant in past 14 days:no  Pharm[facility] utilizes:[facility]    Pt was unable to verify names and strengths of medications    Pt fills at a [facility] in [place]    Verified medications with local [facility]. Per pharmacy Oxybutynin 5 mg BID last dispensed on 12/2023 however pt states he takes 1 tablet every morning

## 2024-09-26 NOTE — ASSESSMENT & PLAN NOTE
9/26/2024  69-year-old male with history of hypertension, diastolic dysfunction grade 1 and aortic regurgitation might to moderate, with preserved EF 60% on echo in 2020, presented with worsening of dyspnea on exertion, lower extremity edema, abdominal distention  Plan: IV Lasix 40 mg every 8 hours  Potassium supplementation  Monitor input and output and daily weight  Follow repeat transthoracic echo  Trend troponins

## 2024-09-26 NOTE — PROGRESS NOTES
Monitor Summary  Rhythm: SB/SR w/ 1st degree and BBB 50-69    Ectopy: none    Intervals: .31/.17/.45

## 2024-09-27 ENCOUNTER — PATIENT OUTREACH (OUTPATIENT)
Dept: SCHEDULING | Facility: IMAGING CENTER | Age: 69
End: 2024-09-27
Payer: MEDICARE

## 2024-09-27 ENCOUNTER — APPOINTMENT (OUTPATIENT)
Dept: CARDIOLOGY | Facility: MEDICAL CENTER | Age: 69
End: 2024-09-27
Attending: INTERNAL MEDICINE
Payer: MEDICARE

## 2024-09-27 ENCOUNTER — PHARMACY VISIT (OUTPATIENT)
Dept: PHARMACY | Facility: MEDICAL CENTER | Age: 69
End: 2024-09-27
Payer: COMMERCIAL

## 2024-09-27 VITALS
WEIGHT: 229.72 LBS | HEIGHT: 74 IN | OXYGEN SATURATION: 93 % | SYSTOLIC BLOOD PRESSURE: 122 MMHG | HEART RATE: 63 BPM | RESPIRATION RATE: 20 BRPM | DIASTOLIC BLOOD PRESSURE: 47 MMHG | TEMPERATURE: 98.1 F | BODY MASS INDEX: 29.48 KG/M2

## 2024-09-27 PROBLEM — I16.0 HYPERTENSIVE URGENCY: Status: RESOLVED | Noted: 2024-09-25 | Resolved: 2024-09-27

## 2024-09-27 PROBLEM — R79.89 TROPONIN LEVEL ELEVATED: Status: RESOLVED | Noted: 2024-09-25 | Resolved: 2024-09-27

## 2024-09-27 LAB
ANION GAP SERPL CALC-SCNC: 18 MMOL/L (ref 7–16)
BUN SERPL-MCNC: 17 MG/DL (ref 8–22)
CALCIUM SERPL-MCNC: 10.4 MG/DL (ref 8.5–10.5)
CHLORIDE SERPL-SCNC: 102 MMOL/L (ref 96–112)
CO2 SERPL-SCNC: 22 MMOL/L (ref 20–33)
CREAT SERPL-MCNC: 1.12 MG/DL (ref 0.5–1.4)
ERYTHROCYTE [DISTWIDTH] IN BLOOD BY AUTOMATED COUNT: 42.9 FL (ref 35.9–50)
GFR SERPLBLD CREATININE-BSD FMLA CKD-EPI: 71 ML/MIN/1.73 M 2
GLUCOSE SERPL-MCNC: 112 MG/DL (ref 65–99)
HCT VFR BLD AUTO: 51.1 % (ref 42–52)
HGB BLD-MCNC: 17.6 G/DL (ref 14–18)
LV EJECT FRACT MOD 2C 99903: 63.78
LV EJECT FRACT MOD 4C 99902: 49.14
LV EJECT FRACT MOD BP 99901: 55.23
MAGNESIUM SERPL-MCNC: 2 MG/DL (ref 1.5–2.5)
MCH RBC QN AUTO: 30.7 PG (ref 27–33)
MCHC RBC AUTO-ENTMCNC: 34.4 G/DL (ref 32.3–36.5)
MCV RBC AUTO: 89.2 FL (ref 81.4–97.8)
PHOSPHATE SERPL-MCNC: 3.5 MG/DL (ref 2.5–4.5)
PLATELET # BLD AUTO: 180 K/UL (ref 164–446)
PMV BLD AUTO: 9.6 FL (ref 9–12.9)
POTASSIUM SERPL-SCNC: 3.7 MMOL/L (ref 3.6–5.5)
RBC # BLD AUTO: 5.73 M/UL (ref 4.7–6.1)
SODIUM SERPL-SCNC: 142 MMOL/L (ref 135–145)
WBC # BLD AUTO: 9.1 K/UL (ref 4.8–10.8)

## 2024-09-27 PROCEDURE — 85027 COMPLETE CBC AUTOMATED: CPT

## 2024-09-27 PROCEDURE — 700102 HCHG RX REV CODE 250 W/ 637 OVERRIDE(OP): Performed by: INTERNAL MEDICINE

## 2024-09-27 PROCEDURE — 93306 TTE W/DOPPLER COMPLETE: CPT

## 2024-09-27 PROCEDURE — 83735 ASSAY OF MAGNESIUM: CPT

## 2024-09-27 PROCEDURE — 80048 BASIC METABOLIC PNL TOTAL CA: CPT

## 2024-09-27 PROCEDURE — 36415 COLL VENOUS BLD VENIPUNCTURE: CPT

## 2024-09-27 PROCEDURE — 84100 ASSAY OF PHOSPHORUS: CPT

## 2024-09-27 PROCEDURE — RXMED WILLOW AMBULATORY MEDICATION CHARGE: Performed by: INTERNAL MEDICINE

## 2024-09-27 PROCEDURE — A9270 NON-COVERED ITEM OR SERVICE: HCPCS | Performed by: INTERNAL MEDICINE

## 2024-09-27 PROCEDURE — 99239 HOSP IP/OBS DSCHRG MGMT >30: CPT | Performed by: INTERNAL MEDICINE

## 2024-09-27 PROCEDURE — 93306 TTE W/DOPPLER COMPLETE: CPT | Mod: 26 | Performed by: INTERNAL MEDICINE

## 2024-09-27 RX ORDER — FUROSEMIDE 20 MG/1
40 TABLET ORAL DAILY
Qty: 60 TABLET | Refills: 0 | Status: SHIPPED | OUTPATIENT
Start: 2024-09-27

## 2024-09-27 RX ORDER — TAMSULOSIN HYDROCHLORIDE 0.4 MG/1
0.4 CAPSULE ORAL EVERY EVENING
Qty: 30 CAPSULE | Refills: 0 | Status: SHIPPED | OUTPATIENT
Start: 2024-09-27

## 2024-09-27 RX ADMIN — ALLOPURINOL 300 MG: 300 TABLET ORAL at 06:00

## 2024-09-27 RX ADMIN — PRAVASTATIN SODIUM 40 MG: 20 TABLET ORAL at 06:00

## 2024-09-27 RX ADMIN — CYANOCOBALAMIN TAB 500 MCG 500 MCG: 500 TAB at 06:00

## 2024-09-27 RX ADMIN — AMLODIPINE BESYLATE 10 MG: 10 TABLET ORAL at 06:00

## 2024-09-27 RX ADMIN — CHOLECALCIFEROL (VITAMIN D3) 10 MCG (400 UNIT) TABLET 400 UNITS: at 06:00

## 2024-09-27 RX ADMIN — OXYBUTYNIN CHLORIDE 5 MG: 5 TABLET, EXTENDED RELEASE ORAL at 06:00

## 2024-09-27 RX ADMIN — TAMSULOSIN HYDROCHLORIDE 0.4 MG: 0.4 CAPSULE ORAL at 17:13

## 2024-09-27 ASSESSMENT — FIBROSIS 4 INDEX: FIB4 SCORE: 3.39

## 2024-09-27 ASSESSMENT — PAIN DESCRIPTION - PAIN TYPE: TYPE: ACUTE PAIN

## 2024-09-27 NOTE — PROGRESS NOTES
Monitor summary:     Rhythm : SB/SR 1st degree and BBB  Rate : 55-60, down to 41 non-sustained  Ectopy : (R-O) PVCs    SD=.30  QRS=.14  QT=.46        Per telemetry strip summary from monitor room.

## 2024-09-27 NOTE — DISCHARGE SUMMARY
Discharge Summary    CHIEF COMPLAINT ON ADMISSION  Chief Complaint   Patient presents with    Shortness of Breath     X 3 days, pt reports SOB that is worse when lays down. Pt denies cough, congestion or flu like symptoms.   Pt reports hx of HTN and hyperlipidemia.        Reason for Admission  Sent by MD     Admission Date  9/25/2024    CODE STATUS  Full Code    HPI & HOSPITAL COURSE  Claudio Steen is a 69 y.o. male with past medical history of hypertension, anxiety, aortic valve regurgitation, diastolic dysfunction grade 1, gout, obstructive sleep apnea, who presented 9/25/2024 with complaints of worsening in office dyspnea on exertion in the last 2 days, bilateral lower extremity edema, abdominal distention.  Last 2 nights he could not sleep due to shortness of breath.  He is from [place], visiting her daughter who lives here  Vital signs in ER showed hypertension 161/57, heart rate 58 on room air.  EKG: Sinus rhythm heart rate 54, prolonged W[place] interval, LAFB, LVH  Chest x-ray: Cardiomegaly  Blood work: Troponin 40, proBNP 2500.    Urine drug screen was positive for cannabinoids and opiates.  He denies chest pain or cough or fever.  Requested admission for new onset heart failure.  Treatment in the ED included Lasix 40 mg IV once, aspirin 325 mg    Echocardiogram showed ejection fraction 55-60%.  Mild aortic insufficiency.    Patient was diuresed aggressively and weaned to room air.  Symptomology resolved.  Patient did report some symptoms of BPH and was started on tamsulosin.    Medically stable to discharge home.  Follow-up with primary care as outpatient.    Therefore, he is discharged in fair and stable condition to home with close outpatient follow-up.    The patient met 2-midnight criteria for an inpatient stay at the time of discharge.    Discharge Date  9/27/2024      FOLLOW UP ITEMS POST DISCHARGE  None    DISCHARGE DIAGNOSES  Principal Problem:    Acute heart failure new onset with preserved  EF (POA: Yes)  Active Problems:    Sleep apnea (POA: Yes)    Gout (POA: Yes)    Aortic regurgitation (POA: Yes)  Resolved Problems:    Troponin level elevated (POA: Yes)    Hypertensive urgency (POA: Yes)      FOLLOW UP  [facility]  [address] [place] [address]  [place] [address] [address]  # [phone]  Go on 10/4/2024  Please arrive at 8:00am for ongoing heart failure treatment. This is a walk-in clinic and you may go anytime between 8:00am and 4:00pm. Patients are seen on a first come, first served basis, wait times may vary. This clinic offers a sliding-fee scale. Thank you.      MEDICATIONS ON DISCHARGE     Medication List        START taking these medications        Instructions   furosemide 20 MG Tabs  Commonly known as: Lasix   Take 2 Tablets by mouth every day.  Dose: 40 mg     tamsulosin 0.4 MG capsule  Commonly known as: Flomax   Take 1 Capsule by mouth every evening.  Dose: 0.4 mg            CONTINUE taking these medications        Instructions   allopurinol 300 MG Tabs  Commonly known as: Zyloprim   Take 300 mg by mouth every day.  Dose: 300 mg     amLODIPine 10 MG Tabs  Commonly known as: Norvasc   Take 10 mg by mouth every day.  Dose: 10 mg     HYDROcodone/acetaminophen  MG Tabs  Commonly known as: Norco   Take 1 Tablet by mouth 2 times a day as needed for Moderate Pain.  Dose: 1 Tablet     Multi-Day Vitamins Tabs   Take 1 Tablet by mouth every day.  Dose: 1 Tablet     OMEGA 3 PO   Take 1 Capsule by mouth every day.  Dose: 1 Capsule     oxybutynin 5 MG Tabs  Commonly known as: Ditropan   Take 5 mg by mouth every day.  Dose: 5 mg     pravastatin 40 MG tablet  Commonly known as: Pravachol   Take 40 mg by mouth every day.  Dose: 40 mg     VITAMIN B12 PO   Take 1 Tablet by mouth every day.  Dose: 1 Tablet     VITAMIN C PO   Take 1 Tablet by mouth every day.  Dose: 1 Tablet     VITAMIN D3 PO   Take 1 Capsule by mouth every day.  Dose: 1 Capsule              Allergies  No Known  Allergies    DIET  Orders Placed This Encounter   Procedures    Diet Order Diet: 2 Gram Sodium     Standing Status:   Standing     Number of Occurrences:   1     Order Specific Question:   Diet:     Answer:   2 Gram Sodium [7]       ACTIVITY  As tolerated.  Weight bearing as tolerated    CONSULTATIONS  None    PROCEDURES  None    LABORATORY  Lab Results   Component Value Date    SODIUM 142 09/27/2024    POTASSIUM 3.7 09/27/2024    CHLORIDE 102 09/27/2024    CO2 22 09/27/2024    GLUCOSE 112 (H) 09/27/2024    BUN 17 09/27/2024    CREATININE 1.12 09/27/2024        Lab Results   Component Value Date    WBC 9.1 09/27/2024    HEMOGLOBIN 17.6 09/27/2024    HEMATOCRIT 51.1 09/27/2024    PLATELETCT 180 09/27/2024      I discussed medications and side effects with the patient.  I discussed prognosis and importance of medical compliance with the patient.  I counseled the patient about diet, exercise, weight loss, smoking cessation, and life style modifications.  All questions and concerns have been addressed.  Total time of the discharge process was 37 minutes.

## 2024-09-27 NOTE — CARE PLAN
The patient is Stable - Low risk of patient condition declining or worsening    Shift Goals  Clinical Goals: Echo, diurese  Patient Goals: Go home  Family Goals: Echo, go home    Progress made toward(s) clinical / shift goals:      Problem: Care Map:  Admission Optimal Outcome for the Heart Failure Patient  Goal: Admission:  Optimal Care of the heart failure patient  Description: Target End Date:  end of day 1  Outcome: Met     Problem: Care Map:  Day 1 Optimal Outcome for the Heart Failure Patient  Goal: Day 1:  Optimal Care of the heart failure patient  Description: Target End Date:  end of day 1  Outcome: Met     Problem: Care Map:  Day 2 Optimal Outcome for the Heart Failure Patient  Goal: Day 2:  Optimal Care of the heart failure patient  Description: Target End Date:  end of day 2  Outcome: Met     Problem: Care Map:  Day 3 Optimal Outcome for the Heart Failure Patient  Goal: Day 3:  Optimal Care of the heart failure patient  Description: Target End Date:  end of day 3  Outcome: Met     Problem: Care Map:  Day Before Discharge Optimal Outcome for the Heart Failure Patient  Goal: Day Before Discharge:  Optimal Care of the heart failure patient  Description: Target End Date:  Prior to discharge or change in level of care  Outcome: Met     Problem: Care Map:  Day of Discharge Optimal Outcome for the Heart Failure Patient  Goal: Day of Discharge:  Optimal Care of the heart failure patient  Description: Target End Date:  Prior to discharge or change in level of care  Outcome: Met     Problem: Knowledge Deficit - Standard  Goal: Patient and family/care givers will demonstrate understanding of plan of care, disease process/condition, diagnostic tests and medications  Description: Target End Date:  1-3 days or as soon as patient condition allows    Document in Patient Education    1.  Patient and family/caregiver oriented to unit, equipment, visitation policy and means for communicating concern  2.  Complete/review  Learning Assessment  3.  Assess knowledge level of disease process/condition, treatment plan, diagnostic tests and medications  4.  Explain disease process/condition, treatment plan, diagnostic tests and medications  Outcome: Met     Problem: Pain - Standard  Goal: Alleviation of pain or a reduction in pain to the patient’s comfort goal  Description: Target End Date:  Prior to discharge or change in level of care    Document on Vitals flowsheet    1.  Document pain using the appropriate pain scale per order or unit policy  2.  Educate and implement non-pharmacologic comfort measures (i.e. relaxation, distraction, massage, cold/heat therapy, etc.)  3.  Pain management medications as ordered  4.  Reassess pain after pain med administration per policy  5.  If opiods administered assess patient's response to pain medication is appropriate per POSS sedation scale  6.  Follow pain management plan developed in collaboration with patient and interdisciplinary team (including palliative care or pain specialists if applicable)  Outcome: Met

## 2024-09-27 NOTE — CARE PLAN
The patient is Stable - Low risk of patient condition declining or worsening    Shift Goals  Clinical Goals: Monitor labs/VS, echo, diurese  Patient Goals: To go home  Family Goals: NA    Progress made toward(s) clinical / shift goals:    Problem: Knowledge Deficit - Standard  Goal: Patient and family/care givers will demonstrate understanding of plan of care, disease process/condition, diagnostic tests and medications  Outcome: Progressing     Problem: Pain - Standard  Goal: Alleviation of pain or a reduction in pain to the patient’s comfort goal  Outcome: Progressing       Patient is not progressing towards the following goals:

## 2024-09-27 NOTE — PROGRESS NOTES
Bedside report received from off going RN/tech: Ashely LAROSE, assumed care of patient.     Fall Risk Score: LOW RISK  Fall risk interventions in place: Place yellow fall risk ID band on patient, Provide patient/family education based on risk assessment, Educate patient/family to call staff for assistance when getting out of bed, Place fall precaution signage outside patient door, Place patient in room close to nursing station, Utilize bed/chair fall alarm, Notify charge of high risk for huddle, and Bed alarm connected correctly  Bed type: Regular (Milton Score less than 17 interventions in place)  Patient on cardiac monitor: Yes  IVF/IV medications: Not Applicable   Oxygen: Room Air  Bedside sitter: Not Applicable   Isolation: Not applicable      Pt resting comfortably, no needs at this time. Bed in low and locked position, call light within reach, will continue to monitor.

## 2024-09-27 NOTE — HOSPITAL COURSE
Claudio Steen is a 69 y.o. male with past medical history of hypertension, anxiety, aortic valve regurgitation, diastolic dysfunction grade 1, gout, obstructive sleep apnea, who presented 9/25/2024 with complaints of worsening in office dyspnea on exertion in the last 2 days, bilateral lower extremity edema, abdominal distention.  Last 2 nights he could not sleep due to shortness of breath.  He is from [place], visiting her daughter who lives here  Vital signs in ER showed hypertension 161/57, heart rate 58 on room air.  EKG: Sinus rhythm heart rate 54, prolonged IA interval, LAFB, LVH  Chest x-ray: Cardiomegaly  Blood work: Troponin 40, proBNP 2500.    Urine drug screen was positive for cannabinoids and opiates.  He denies chest pain or cough or fever.  Requested admission for new onset heart failure.  Treatment in the ED included Lasix 40 mg IV once, aspirin 325 mg    Echocardiogram showed ejection fraction 55-60%.  Mild aortic insufficiency.    Patient was diuresed aggressively and weaned to room air.  Symptomology resolved.  Patient did report some symptoms of BPH and was started on tamsulosin.    Medically stable to discharge home.  Follow-up with primary care as outpatient.

## 2024-09-27 NOTE — DISCHARGE INSTRUCTIONS
Discharge Instructions per [facility] ArandaO.    DIET: Diet Order Diet: 2 Gram Sodium    ACTIVITY: As tolerated    A proper diet that is low in grease, fat, and salt, along with 30 minutes of exercise per day will lead to weight loss, and better controlled blood sugar and blood pressure.    DIAGNOSIS: Heart failure (HCC)    Follow up with your Primary Care Provider Pcp as scheduled or sooner if your symptoms persist or worsen.  Return to Emergency Room for sever chest pain, shortness of breath, signs of a stroke, or any other emergencies.    HF Patient Discharge Instructions  Monitor your weight daily, and maintain a weight chart, to track your weight changes.   Activity as tolerated, unless your Doctor has ordered otherwise. Other activity order: .  Follow a low fat, low cholesterol, low salt diet unless instructed otherwise by your Doctor. Read the labels on the back of food products and track your intake of fat, cholesterol and salt.   Fluid Restriction No. If a Fluid Restriction has been ordered by your Doctor, measure fluids with a measuring cup to ensure that you are not exceeding the restriction.   No smoking.  Oxygen Yes. If your Doctor has ordered that you wear Oxygen at home, it is important to wear it as ordered.  Did you receive an explanation from staff on the importance of taking each of your medications and why it is necessary to keep taking them unless your doctor says to stop? Yes  Were all of your questions answered about how to manage your heart failure and what to do if you have increased signs and symptoms after you go home? Yes  Do you feel like your heart failure care team involved you in the care treatment plan and allowed you to make decisions regarding your care while in the hospital and addressed any discharge needs you might have? Yes    See the educational handout provided at discharge for more information on monitoring your daily weight, activity and diet. This also explains  more about Heart Failure, symptoms of a flare-up and some of the tests that you have undergone.     Warning Signs of a Flare-Up include:  Swelling in the ankles or lower legs.  Shortness of breath, while at rest, or while doing normal activities.   Shortness of breath at night when in bed, or coughing in bed.   Requiring more pillows to sleep at night, or needing to sit up at night to sleep.  Feeling weak, dizzy or fatigued.     When to call your Doctor:  Call your Primary Care Physician or Cardiologist if:   You experience any pain radiating to your jaw or neck.  You have any difficulty breathing.  You experience weight gain of 3 lbs in a day or 5 lbs in a week.   You feel any palpitations or irregular heartbeats.  You become dizzy or lose consciousness.   If you have had an angiogram or had a pacemaker or AICD placed, and experience:  Bleeding, drainage or swelling at the surgical / puncture site.  Fever greater than 100.0 F  Shock from internal defibrillator.  Cool and / or numb extremities.     Please access the [facility] My HF Guide/Heart Failure Interactive Workbook:   [email or web address]

## (undated) DEVICE — CLOSURE SKIN STERI STRIP 1/2X4

## (undated) DEVICE — BASKET STNE RTRVL HLC 3F 4WR O

## (undated) DEVICE — WIRE GUIDE 0.038OLD

## (undated) DEVICE — Device

## (undated) DEVICE — EXTRACTOR TIPLESS 2.4FRX1115CM

## (undated) DEVICE — DRESSING LEUKOPLAST FLEX 1X3IN

## (undated) DEVICE — CATH URETERAL POLY CONE TI

## (undated) DEVICE — SEE MEDLINE ITEM 154981

## (undated) DEVICE — SYR ONLY LUER LOCK 20CC

## (undated) DEVICE — SET IRR URLGY 2LINE UNIV SPIKE

## (undated) DEVICE — SOL WATER STRL IRR 1000ML

## (undated) DEVICE — POSITIONER HEAD DONUT 9IN FOAM

## (undated) DEVICE — ADAPTER TUOHY BORST 6FR

## (undated) DEVICE — SKIN MARKER DEVON 160

## (undated) DEVICE — ELECTRODE REM PLYHSV RETURN 9

## (undated) DEVICE — CONTAINER SPECIMEN STRL 4OZ

## (undated) DEVICE — SOL IRR NACL .9% 3000ML

## (undated) DEVICE — UROVIEW 2600/2800

## (undated) DEVICE — SEE MEDLINE ITEM 152487

## (undated) DEVICE — SPONGE GAUZE 16PLY 4X4

## (undated) DEVICE — MANIFOLD 4 PORT

## (undated) DEVICE — CANISTER SUCTION MEDI-VAC 12L

## (undated) DEVICE — GLOVE SURGICAL LATEX SZ 8

## (undated) DEVICE — SUPPORT ULNA NERVE PROTECTOR

## (undated) DEVICE — SEE MEDLINE ITEM 152622

## (undated) DEVICE — SEE MEDLINE ITEM 157185

## (undated) DEVICE — GOWN SMARTGOWN LVL4 X-LONG XL

## (undated) DEVICE — ADHESIVE MASTISOL VIAL 48/BX